# Patient Record
Sex: FEMALE | Race: OTHER | NOT HISPANIC OR LATINO | Employment: STUDENT | URBAN - METROPOLITAN AREA
[De-identification: names, ages, dates, MRNs, and addresses within clinical notes are randomized per-mention and may not be internally consistent; named-entity substitution may affect disease eponyms.]

---

## 2023-09-26 PROBLEM — N94.6 PAINFUL MENSTRUAL PERIODS: Status: ACTIVE | Noted: 2023-09-26

## 2023-09-26 RX ORDER — ONDANSETRON 4 MG/1
1 TABLET, FILM COATED ORAL 3 TIMES DAILY PRN
COMMUNITY
Start: 2022-08-16 | End: 2023-10-03 | Stop reason: ALTCHOICE

## 2023-09-26 RX ORDER — IBUPROFEN 800 MG/1
800 TABLET ORAL EVERY 8 HOURS PRN
COMMUNITY
Start: 2021-10-04 | End: 2023-10-03

## 2023-09-26 RX ORDER — NAPROXEN 500 MG/1
1 TABLET ORAL EVERY 12 HOURS PRN
COMMUNITY
Start: 2022-08-16 | End: 2023-10-03

## 2023-10-03 ENCOUNTER — APPOINTMENT (OUTPATIENT)
Dept: OBSTETRICS AND GYNECOLOGY | Facility: CLINIC | Age: 18
End: 2023-10-03
Payer: COMMERCIAL

## 2023-10-03 ENCOUNTER — LAB (OUTPATIENT)
Dept: LAB | Facility: LAB | Age: 18
End: 2023-10-03
Payer: COMMERCIAL

## 2023-10-03 ENCOUNTER — INITIAL PRENATAL (OUTPATIENT)
Dept: OBSTETRICS AND GYNECOLOGY | Facility: CLINIC | Age: 18
End: 2023-10-03
Payer: COMMERCIAL

## 2023-10-03 VITALS — DIASTOLIC BLOOD PRESSURE: 60 MMHG | SYSTOLIC BLOOD PRESSURE: 100 MMHG | BODY MASS INDEX: 22.89 KG/M2 | WEIGHT: 117.2 LBS

## 2023-10-03 DIAGNOSIS — Z3A.01 5 WEEKS GESTATION OF PREGNANCY (HHS-HCC): ICD-10-CM

## 2023-10-03 DIAGNOSIS — Z32.01 PREGNANCY CONFIRMED BY POSITIVE URINE TEST (HHS-HCC): ICD-10-CM

## 2023-10-03 DIAGNOSIS — Z34.01 PRIMIGRAVIDA, FIRST TRIMESTER (HHS-HCC): ICD-10-CM

## 2023-10-03 DIAGNOSIS — Z34.01 PRIMIGRAVIDA, FIRST TRIMESTER (HHS-HCC): Primary | ICD-10-CM

## 2023-10-03 LAB
AMPHETAMINES UR QL SCN: NORMAL
BARBITURATES UR QL SCN: NORMAL
BENZODIAZ UR QL SCN: NORMAL
BZE UR QL SCN: NORMAL
CANNABINOIDS UR QL SCN: NORMAL
ERYTHROCYTE [DISTWIDTH] IN BLOOD BY AUTOMATED COUNT: 13.5 % (ref 11.5–14.5)
FENTANYL+NORFENTANYL UR QL SCN: NORMAL
GLUCOSE URINE, POC: NEGATIVE
HCT VFR BLD AUTO: 39.7 % (ref 36–46)
HCV AB SER QL: NONREACTIVE
HGB BLD-MCNC: 13 G/DL (ref 12–16)
HIV 1+2 AB+HIV1 P24 AG SERPL QL IA: NONREACTIVE
MCH RBC QN AUTO: 29 PG (ref 26–34)
MCHC RBC AUTO-ENTMCNC: 32.7 G/DL (ref 32–36)
MCV RBC AUTO: 89 FL (ref 80–100)
NRBC BLD-RTO: 0 /100 WBCS (ref 0–0)
OPIATES UR QL SCN: NORMAL
OXYCODONE+OXYMORPHONE UR QL SCN: NORMAL
PCP UR QL SCN: NORMAL
PLATELET # BLD AUTO: 310 X10*3/UL (ref 150–450)
PMV BLD AUTO: 11 FL (ref 7.5–11.5)
PREGNANCY TEST URINE, POC: POSITIVE
RBC # BLD AUTO: 4.48 X10*6/UL (ref 4–5.2)
T PALLIDUM AB SER QL: NONREACTIVE
URINE PROTEIN, POC: NEGATIVE
WBC # BLD AUTO: 4.7 X10*3/UL (ref 4.4–11.3)

## 2023-10-03 PROCEDURE — 81025 URINE PREGNANCY TEST: CPT | Performed by: OBSTETRICS & GYNECOLOGY

## 2023-10-03 PROCEDURE — 36415 COLL VENOUS BLD VENIPUNCTURE: CPT

## 2023-10-03 PROCEDURE — 99214 OFFICE O/P EST MOD 30 MIN: CPT | Performed by: OBSTETRICS & GYNECOLOGY

## 2023-10-03 RX ORDER — MULTIVITAMIN
1 TABLET ORAL DAILY
COMMUNITY
End: 2024-05-06 | Stop reason: WASHOUT

## 2023-10-03 ASSESSMENT — ENCOUNTER SYMPTOMS
RESPIRATORY NEGATIVE: 0
NEUROLOGICAL NEGATIVE: 0
ENDOCRINE NEGATIVE: 0
HEMATOLOGIC/LYMPHATIC NEGATIVE: 0
PSYCHIATRIC NEGATIVE: 0
GASTROINTESTINAL NEGATIVE: 0
CONSTITUTIONAL NEGATIVE: 0
EYES NEGATIVE: 0
ALLERGIC/IMMUNOLOGIC NEGATIVE: 0
MUSCULOSKELETAL NEGATIVE: 0
CARDIOVASCULAR NEGATIVE: 0

## 2023-10-03 NOTE — PROGRESS NOTES
Urine protein and glucose negative. No recent travel, no travel plans during the pregnancy. COVID vaccinated x3 doses. Aware of Haylee deliveries. On call pediatrician.

## 2023-10-03 NOTE — PROGRESS NOTES
Subjective   Patient ID [id number]   Horacio Dominguez is a 18 y.o.  at 5w1d with a working estimated date of delivery of 6/3/2024, by Last Menstrual Period who presents for an initial prenatal visit. This pregnancy is uncomplicated, except they are first cousins but tested at home with negative major genetic issue. planned.    Her pregnancy is complicated by:  Consanguinty    OB History    Para Term  AB Living   1             SAB IAB Ectopic Multiple Live Births                  # Outcome Date GA Lbr Aniceto/2nd Weight Sex Delivery Anes PTL Lv   1 Current               Obstetric Comments   History of regular cycles          Objective   Physical Exam  Weight: 53.2 kg (117 lb 3.2 oz)  Expected Total Weight Gain: 11.5 kg (25 lb)-16 kg (35 lb)   Pregravid BMI: 22.85  BP: 100/60          OBGyn Exam    Prenatal Labs  None    Assessment/Plan   Intrauterine pregnancy at 5 weeks no significant risk factors identified patient and her  first cousin.  Okay and then    Immunizations: Immunization COVID vaccinated to get flu shot.  Prenatal Labs ordered  Daily prenatal vitamins prescribed  First trimester screening and second trimester screening discussed. Patient decided to Had testing back home for Thlasemia and anemia.   Follow up in 4 weeks for return OB visit.

## 2023-10-04 LAB
ABO GROUP (TYPE) IN BLOOD: NORMAL
ANTIBODY SCREEN: NORMAL
HBV SURFACE AG SERPL QL IA: NONREACTIVE
HEMOGLOBIN A2: 3.2 % (ref 2–3.5)
HEMOGLOBIN A: 96.3 % (ref 95.8–98)
HEMOGLOBIN C: 0 %
HEMOGLOBIN F: 0.5 % (ref 0–2)
HEMOGLOBIN IDENTIFICATION INTERPRETATION: NORMAL
HEMOGLOBIN OTHER: 0 %
HEMOGLOBIN S: 0 %
N GONORRHOEA DNA SPEC QL PROBE+SIG AMP: NEGATIVE
PATH REVIEW-HGB IDENTIFICATION: NORMAL
REFLEX ADDED, ANEMIA PANEL: NORMAL
RH FACTOR (ANTIGEN D): NORMAL
RUBV IGG SERPL IA-ACNC: 4.4 IA
RUBV IGG SERPL QL IA: POSITIVE

## 2023-10-05 LAB — BACTERIA UR CULT: NORMAL

## 2023-10-23 ENCOUNTER — ANCILLARY PROCEDURE (OUTPATIENT)
Dept: RADIOLOGY | Facility: CLINIC | Age: 18
End: 2023-10-23
Payer: COMMERCIAL

## 2023-10-23 ENCOUNTER — ROUTINE PRENATAL (OUTPATIENT)
Dept: OBSTETRICS AND GYNECOLOGY | Facility: CLINIC | Age: 18
End: 2023-10-23
Payer: COMMERCIAL

## 2023-10-23 VITALS — SYSTOLIC BLOOD PRESSURE: 98 MMHG | WEIGHT: 118.4 LBS | DIASTOLIC BLOOD PRESSURE: 62 MMHG | BODY MASS INDEX: 23.12 KG/M2

## 2023-10-23 DIAGNOSIS — Z34.01 ENCOUNTER FOR PRENATAL CARE IN FIRST TRIMESTER OF FIRST PREGNANCY (HHS-HCC): ICD-10-CM

## 2023-10-23 DIAGNOSIS — Z3A.08 8 WEEKS GESTATION OF PREGNANCY (HHS-HCC): Primary | ICD-10-CM

## 2023-10-23 DIAGNOSIS — O20.0 THREATENED ABORTION, ANTEPARTUM CONDITION OR COMPLICATION (HHS-HCC): ICD-10-CM

## 2023-10-23 DIAGNOSIS — Z34.01 PRIMIGRAVIDA, FIRST TRIMESTER (HHS-HCC): ICD-10-CM

## 2023-10-23 PROCEDURE — 76817 TRANSVAGINAL US OBSTETRIC: CPT | Performed by: OBSTETRICS & GYNECOLOGY

## 2023-10-23 PROCEDURE — 76801 OB US < 14 WKS SINGLE FETUS: CPT | Performed by: OBSTETRICS & GYNECOLOGY

## 2023-10-23 PROCEDURE — 99212 OFFICE O/P EST SF 10 MIN: CPT | Performed by: OBSTETRICS & GYNECOLOGY

## 2023-10-23 PROCEDURE — 76801 OB US < 14 WKS SINGLE FETUS: CPT

## 2023-10-23 PROCEDURE — 76817 TRANSVAGINAL US OBSTETRIC: CPT

## 2023-10-23 NOTE — PROGRESS NOTES
Subjective   Patient ID: Horacio Dominguez is a 18 y.o. female who presents for Pregnancy Ultrasound (Ultrasound this morning repeat in 1 week no FHB).  HPI 18-year-old [place] and G1, P0 who had an ultrasound this morning for dates and viability which showed no cardiac activity.  There was a gestational sac and a yolk sac that seem to be collapsing in a fetal pole without any heart rate.  She reports small spotting mostly dark brown.  No significant cramping.  She denies any nausea or vomiting but feels tired.    Review of Systems   All other systems reviewed and are negative.      Objective   Physical Exam  Constitutional:       Appearance: Normal appearance.   Pulmonary:      Effort: Pulmonary effort is normal.   Neurological:      Mental Status: She is alert.   Psychiatric:         Mood and Affect: Mood normal.         Assessment/Plan   Problem List Items Addressed This Visit             ICD-10-CM    Threatened , antepartum condition or complication O20.0    8 weeks gestation of pregnancy - Primary Z3A.08     Other Visit Diagnoses         Codes    Encounter for prenatal care in first trimester of first pregnancy     Z34.01    Relevant Orders    US MAC OB imaging order        I met with patient and her  today.  They had ultrasound this morning which showed no cardiac activity.  This seems to be Missed AB.   They are scheduled for follow-up ultrasound next week.  We did discuss that miscarriage rate is high and this is most likely a missed AB.  I discussed that she may start bleeding heavier and passed tissue on her own prior to her next appointment or if she has not done that then she can elect for suction D&C after her next ultrasound.  I explained what the procedure is outpatient.  She will call if her bleeding is very heavy and she is concerned.

## 2023-10-30 ENCOUNTER — APPOINTMENT (OUTPATIENT)
Dept: OBSTETRICS AND GYNECOLOGY | Facility: CLINIC | Age: 18
End: 2023-10-30
Payer: COMMERCIAL

## 2023-11-03 ENCOUNTER — ANCILLARY PROCEDURE (OUTPATIENT)
Dept: RADIOLOGY | Facility: CLINIC | Age: 18
End: 2023-11-03
Payer: COMMERCIAL

## 2023-11-03 DIAGNOSIS — Z34.01 ENCOUNTER FOR PRENATAL CARE IN FIRST TRIMESTER OF FIRST PREGNANCY (HHS-HCC): ICD-10-CM

## 2023-11-03 PROCEDURE — 76817 TRANSVAGINAL US OBSTETRIC: CPT | Performed by: STUDENT IN AN ORGANIZED HEALTH CARE EDUCATION/TRAINING PROGRAM

## 2023-11-03 PROCEDURE — 76816 OB US FOLLOW-UP PER FETUS: CPT

## 2023-11-03 PROCEDURE — 76816 OB US FOLLOW-UP PER FETUS: CPT | Performed by: STUDENT IN AN ORGANIZED HEALTH CARE EDUCATION/TRAINING PROGRAM

## 2023-11-03 PROCEDURE — 76817 TRANSVAGINAL US OBSTETRIC: CPT

## 2023-11-06 ENCOUNTER — ROUTINE PRENATAL (OUTPATIENT)
Dept: OBSTETRICS AND GYNECOLOGY | Facility: CLINIC | Age: 18
End: 2023-11-06
Payer: COMMERCIAL

## 2023-11-06 VITALS — BODY MASS INDEX: 22.07 KG/M2 | DIASTOLIC BLOOD PRESSURE: 62 MMHG | WEIGHT: 113 LBS | SYSTOLIC BLOOD PRESSURE: 98 MMHG

## 2023-11-06 DIAGNOSIS — O20.0 THREATENED ABORTION, ANTEPARTUM CONDITION OR COMPLICATION (HHS-HCC): ICD-10-CM

## 2023-11-06 DIAGNOSIS — Z3A.08 8 WEEKS GESTATION OF PREGNANCY (HHS-HCC): ICD-10-CM

## 2023-11-07 ENCOUNTER — OFFICE VISIT (OUTPATIENT)
Dept: OBSTETRICS AND GYNECOLOGY | Facility: CLINIC | Age: 18
End: 2023-11-07
Payer: COMMERCIAL

## 2023-11-07 DIAGNOSIS — O03.9 COMPLETE OR UNSPECIFIED SPONTANEOUS ABORTION WITHOUT COMPLICATION (HHS-HCC): ICD-10-CM

## 2023-11-07 DIAGNOSIS — N94.6 DYSMENORRHEA: Primary | ICD-10-CM

## 2023-11-07 PROCEDURE — 99212 OFFICE O/P EST SF 10 MIN: CPT | Performed by: OBSTETRICS & GYNECOLOGY

## 2023-11-07 PROCEDURE — 1036F TOBACCO NON-USER: CPT | Performed by: OBSTETRICS & GYNECOLOGY

## 2023-11-07 RX ORDER — NAPROXEN 500 MG/1
500 TABLET ORAL
Qty: 60 TABLET | Refills: 11 | Status: SHIPPED | OUTPATIENT
Start: 2023-11-07 | End: 2024-05-06

## 2023-11-07 NOTE — PROGRESS NOTES
Subjective   Patient ID: Horacio Dominguez is a 18 y.o. female who presents for No chief complaint on file..  HPI 18-year-old G1, P0 with a recent spontaneous miscarriage who is here for follow-up.  She had an ultrasound done on  which showed no evidence of intrauterine pregnancy no blood flow to the endometrial cavity endometrial lining homogeneous at 8 mm.  She reports that her bleeding has slowed down significantly only spotting and no significant cramping.  She is planning to try again soon.  I advised her to continue with prenatal vitamin daily use condoms for the next 2 to 3 weeks await a spontaneous.  Once she has an menstrual cycle then she can actively try again.  The chances of very good that she will get pregnant soon and will go to term.  Her blood type is B+.  Emotionally she seems to be doing okay.    Review of Systems   All other systems reviewed and are negative.      Objective   Physical Exam  Constitutional:       Appearance: Normal appearance.   Cardiovascular:      Rate and Rhythm: Normal rate and regular rhythm.   Pulmonary:      Effort: Pulmonary effort is normal.   Neurological:      Mental Status: She is alert.   Psychiatric:         Mood and Affect: Mood normal.         Behavior: Behavior normal.         Assessment/Plan   Problem List Items Addressed This Visit             ICD-10-CM    Dysmenorrhea - Primary N94.6    Relevant Medications    naproxen (Naprosyn) 500 mg tablet    Complete or unspecified spontaneous  without complication O03.9   Follow-up is as needed.  Recommended that she continues to take prenatal vitamin daily prescription for Naprosyn for painful.  Was sent to her pharmacy.

## 2023-11-20 ENCOUNTER — APPOINTMENT (OUTPATIENT)
Dept: OBSTETRICS AND GYNECOLOGY | Facility: CLINIC | Age: 18
End: 2023-11-20
Payer: COMMERCIAL

## 2023-12-18 ENCOUNTER — APPOINTMENT (OUTPATIENT)
Dept: OBSTETRICS AND GYNECOLOGY | Facility: CLINIC | Age: 18
End: 2023-12-18
Payer: COMMERCIAL

## 2024-01-15 ENCOUNTER — APPOINTMENT (OUTPATIENT)
Dept: OBSTETRICS AND GYNECOLOGY | Facility: CLINIC | Age: 19
End: 2024-01-15
Payer: COMMERCIAL

## 2024-02-12 ENCOUNTER — APPOINTMENT (OUTPATIENT)
Dept: OBSTETRICS AND GYNECOLOGY | Facility: CLINIC | Age: 19
End: 2024-02-12
Payer: COMMERCIAL

## 2024-03-11 ENCOUNTER — APPOINTMENT (OUTPATIENT)
Dept: OBSTETRICS AND GYNECOLOGY | Facility: CLINIC | Age: 19
End: 2024-03-11
Payer: COMMERCIAL

## 2024-03-25 ENCOUNTER — APPOINTMENT (OUTPATIENT)
Dept: OBSTETRICS AND GYNECOLOGY | Facility: CLINIC | Age: 19
End: 2024-03-25
Payer: COMMERCIAL

## 2024-04-08 ENCOUNTER — APPOINTMENT (OUTPATIENT)
Dept: OBSTETRICS AND GYNECOLOGY | Facility: CLINIC | Age: 19
End: 2024-04-08
Payer: COMMERCIAL

## 2024-04-17 PROBLEM — O20.0 THREATENED ABORTION, ANTEPARTUM CONDITION OR COMPLICATION (HHS-HCC): Status: RESOLVED | Noted: 2023-10-23 | Resolved: 2023-11-06

## 2024-04-17 PROBLEM — Z3A.08 8 WEEKS GESTATION OF PREGNANCY (HHS-HCC): Status: RESOLVED | Noted: 2023-10-23 | Resolved: 2023-11-06

## 2024-04-22 ENCOUNTER — APPOINTMENT (OUTPATIENT)
Dept: OBSTETRICS AND GYNECOLOGY | Facility: CLINIC | Age: 19
End: 2024-04-22
Payer: COMMERCIAL

## 2024-05-06 ENCOUNTER — APPOINTMENT (OUTPATIENT)
Dept: OBSTETRICS AND GYNECOLOGY | Facility: CLINIC | Age: 19
End: 2024-05-06
Payer: COMMERCIAL

## 2024-05-06 ENCOUNTER — INITIAL PRENATAL (OUTPATIENT)
Dept: OBSTETRICS AND GYNECOLOGY | Facility: CLINIC | Age: 19
End: 2024-05-06
Payer: COMMERCIAL

## 2024-05-06 VITALS — BODY MASS INDEX: 21.76 KG/M2 | DIASTOLIC BLOOD PRESSURE: 58 MMHG | WEIGHT: 111.4 LBS | SYSTOLIC BLOOD PRESSURE: 104 MMHG

## 2024-05-06 DIAGNOSIS — Z3A.01 7 WEEKS GESTATION OF PREGNANCY (HHS-HCC): ICD-10-CM

## 2024-05-06 DIAGNOSIS — Z34.91 FIRST TRIMESTER PREGNANCY (HHS-HCC): ICD-10-CM

## 2024-05-06 DIAGNOSIS — Z32.01 POSITIVE URINE PREGNANCY TEST (HHS-HCC): ICD-10-CM

## 2024-05-06 LAB
AMPHETAMINES UR QL SCN: NORMAL
BARBITURATES UR QL SCN: NORMAL
BENZODIAZ UR QL SCN: NORMAL
BZE UR QL SCN: NORMAL
CANNABINOIDS UR QL SCN: NORMAL
FENTANYL+NORFENTANYL UR QL SCN: NORMAL
METHADONE UR QL SCN: NORMAL
OPIATES UR QL SCN: NORMAL
OXYCODONE+OXYMORPHONE UR QL SCN: NORMAL
PCP UR QL SCN: NORMAL
POC GLUCOSE, URINE: NEGATIVE MG/DL
POC PROTEIN, URINE: NEGATIVE MG/DL
PREGNANCY TEST URINE, POC: POSITIVE

## 2024-05-06 PROCEDURE — 0500F INITIAL PRENATAL CARE VISIT: CPT | Performed by: OBSTETRICS & GYNECOLOGY

## 2024-05-06 PROCEDURE — 87800 DETECT AGNT MULT DNA DIREC: CPT

## 2024-05-06 PROCEDURE — 81025 URINE PREGNANCY TEST: CPT | Performed by: OBSTETRICS & GYNECOLOGY

## 2024-05-06 PROCEDURE — 87086 URINE CULTURE/COLONY COUNT: CPT

## 2024-05-06 PROCEDURE — 87661 TRICHOMONAS VAGINALIS AMPLIF: CPT

## 2024-05-06 PROCEDURE — 80307 DRUG TEST PRSMV CHEM ANLYZR: CPT

## 2024-05-06 RX ORDER — FOLIC ACID 0.8 MG
0.8 TABLET ORAL DAILY
COMMUNITY

## 2024-05-06 ASSESSMENT — EDINBURGH POSTNATAL DEPRESSION SCALE (EPDS)
I HAVE BEEN SO UNHAPPY THAT I HAVE BEEN CRYING: ONLY OCCASIONALLY
I HAVE LOOKED FORWARD WITH ENJOYMENT TO THINGS: AS MUCH AS I EVER DID
I HAVE BEEN ANXIOUS OR WORRIED FOR NO GOOD REASON: NO, NOT AT ALL
THINGS HAVE BEEN GETTING ON TOP OF ME: YES, SOMETIMES I HAVEN'T BEEN COPING AS WELL AS USUAL
I HAVE FELT SAD OR MISERABLE: NOT VERY OFTEN
THE THOUGHT OF HARMING MYSELF HAS OCCURRED TO ME: NEVER
I HAVE FELT SCARED OR PANICKY FOR NO GOOD REASON: NO, NOT AT ALL
I HAVE BEEN SO UNHAPPY THAT I HAVE HAD DIFFICULTY SLEEPING: YES, SOMETIMES
TOTAL SCORE: 8
I HAVE BLAMED MYSELF UNNECESSARILY WHEN THINGS WENT WRONG: YES, SOME OF THE TIME
I HAVE BEEN ABLE TO LAUGH AND SEE THE FUNNY SIDE OF THINGS: AS MUCH AS I ALWAYS COULD

## 2024-05-06 NOTE — PROGRESS NOTES
Subjective   Patient ID [id number]   Horacio Dominguez is a 18 y.o.  at 7w1d with a working estimated date of delivery of 2024, by Last Menstrual Period who presents for an initial prenatal visit. This pregnancy is planned.  Doing well.   Happy.  Recent SAB in 2023.       Her pregnancy is complicated by:  Recent AB    OB History    Para Term  AB Living   2       1     SAB IAB Ectopic Multiple Live Births   1       0      # Outcome Date GA Lbr Aniceto/2nd Weight Sex Delivery Anes PTL Lv   2 Current            1 SAB 10/28/23 8w5d    Complete         Obstetric Comments   History of regular cycles     [place]  Depression Scale Total: 8    Objective   Physical Exam  Weight: 50.5 kg (111 lb 6.4 oz)  Expected Total Weight Gain: 11.5 kg (25 lb)-16 kg (35 lb)   Pregravid BMI: 21.68  BP: 104/58          Physical Exam  Genitourinary:      Vaginal exam comments: Hymenal band seen. .         Prenatal Labs  Normal in 10/2023    Assessment/Plan   Problem List Items Addressed This Visit    None  Visit Diagnoses         Codes    Positive urine pregnancy test ([facility])     Z32.01    Relevant Orders    POCT pregnancy, urine manually resulted (Completed)    POCT UA (nonautomated) manually resulted (Completed)    First trimester pregnancy ([facility])     Z34.91    Relevant Orders    C. Trachomatis / N. Gonorrhoeae, Amplified Detection    Trichomonas vaginalis, Nucleic Acid Detection    CBC Anemia Panel With Reflex,Pregnancy    Drug Screen, Urine With Reflex to Confirmation    Hepatitis B Surface Antigen    Hepatitis C Antibody    HIV 1/2 Antigen/Antibody Screen with Reflex to Confirmation    Myriad Foresight Carrier Screen    POCT pregnancy, urine manually resulted (Completed)    POCT UA (nonautomated) manually resulted (Completed)    Rubella Antibody, IgG    Syphilis Screen with Reflex    Type And Screen    Urine Culture    US MAC OB imaging order    US OB < 14 weeks early    US OB detail fetal anatomy     7 weeks gestation of pregnancy ([facility])     Z3A.01            Immunizations: Up to date  Prenatal Labs from 10/2023 normal.   Daily prenatal vitamins prescribed is on Folic acid 1 mg daily as well.   First trimester screening and second trimester screening discussed. Patient decided to proceed.   Follow up in 4 weeks for return OB visit.

## 2024-05-06 NOTE — PROGRESS NOTES
Aware of [facility]lee deliveries  COVID vaccinated x3 doses  SAB x1  Undecided on pediatrician  Unsure for birth control after delivery  Came from [place] [facility] December 2023, no travel plans at current time  EPDS - 8  Recent SAB 10/28/2023 approx 8 weeks

## 2024-05-07 LAB
C TRACH RRNA SPEC QL NAA+PROBE: NEGATIVE
N GONORRHOEA DNA SPEC QL PROBE+SIG AMP: NEGATIVE
T VAGINALIS RRNA SPEC QL NAA+PROBE: NEGATIVE

## 2024-05-08 LAB — BACTERIA UR CULT: NO GROWTH

## 2024-05-13 ENCOUNTER — APPOINTMENT (OUTPATIENT)
Dept: OBSTETRICS AND GYNECOLOGY | Facility: CLINIC | Age: 19
End: 2024-05-13
Payer: COMMERCIAL

## 2024-05-20 ENCOUNTER — APPOINTMENT (OUTPATIENT)
Dept: OBSTETRICS AND GYNECOLOGY | Facility: CLINIC | Age: 19
End: 2024-05-20
Payer: COMMERCIAL

## 2024-05-24 ENCOUNTER — HOSPITAL ENCOUNTER (OUTPATIENT)
Dept: RADIOLOGY | Facility: CLINIC | Age: 19
Discharge: HOME | End: 2024-05-24
Payer: COMMERCIAL

## 2024-05-24 DIAGNOSIS — Z34.91 FIRST TRIMESTER PREGNANCY (HHS-HCC): ICD-10-CM

## 2024-05-24 PROCEDURE — 76801 OB US < 14 WKS SINGLE FETUS: CPT

## 2024-05-24 PROCEDURE — 76801 OB US < 14 WKS SINGLE FETUS: CPT | Performed by: OBSTETRICS & GYNECOLOGY

## 2024-05-27 ENCOUNTER — APPOINTMENT (OUTPATIENT)
Dept: RADIOLOGY | Facility: HOSPITAL | Age: 19
End: 2024-05-27
Payer: COMMERCIAL

## 2024-05-27 ENCOUNTER — HOSPITAL ENCOUNTER (EMERGENCY)
Facility: HOSPITAL | Age: 19
Discharge: HOME | End: 2024-05-27
Attending: STUDENT IN AN ORGANIZED HEALTH CARE EDUCATION/TRAINING PROGRAM
Payer: COMMERCIAL

## 2024-05-27 VITALS
OXYGEN SATURATION: 99 % | HEART RATE: 96 BPM | BODY MASS INDEX: 20.4 KG/M2 | HEIGHT: 61 IN | WEIGHT: 108.03 LBS | DIASTOLIC BLOOD PRESSURE: 66 MMHG | TEMPERATURE: 98.6 F | SYSTOLIC BLOOD PRESSURE: 104 MMHG | RESPIRATION RATE: 17 BRPM

## 2024-05-27 DIAGNOSIS — O99.891 ANTEPARTUM ASYMPTOMATIC BACTERIURIA (HHS-HCC): ICD-10-CM

## 2024-05-27 DIAGNOSIS — O20.0 THREATENED MISCARRIAGE (HHS-HCC): Primary | ICD-10-CM

## 2024-05-27 DIAGNOSIS — R82.71 ANTEPARTUM ASYMPTOMATIC BACTERIURIA (HHS-HCC): ICD-10-CM

## 2024-05-27 LAB
ABO GROUP (TYPE) IN BLOOD: NORMAL
ALBUMIN SERPL BCP-MCNC: 4.1 G/DL (ref 3.4–5)
ALP SERPL-CCNC: 38 U/L (ref 33–110)
ALT SERPL W P-5'-P-CCNC: 8 U/L (ref 7–45)
ANION GAP SERPL CALC-SCNC: 13 MMOL/L (ref 10–20)
ANTIBODY SCREEN: NORMAL
APPEARANCE UR: ABNORMAL
AST SERPL W P-5'-P-CCNC: 13 U/L (ref 9–39)
B-HCG SERPL-ACNC: ABNORMAL MIU/ML
BACTERIA #/AREA URNS AUTO: ABNORMAL /HPF
BASOPHILS # BLD AUTO: 0 X10*3/UL (ref 0–0.1)
BASOPHILS NFR BLD AUTO: 0 %
BILIRUB SERPL-MCNC: 0.2 MG/DL (ref 0–1.2)
BILIRUB UR STRIP.AUTO-MCNC: NEGATIVE MG/DL
BUN SERPL-MCNC: 7 MG/DL (ref 6–23)
CALCIUM SERPL-MCNC: 8.8 MG/DL (ref 8.6–10.3)
CHLORIDE SERPL-SCNC: 104 MMOL/L (ref 98–107)
CO2 SERPL-SCNC: 22 MMOL/L (ref 21–32)
COLOR UR: YELLOW
CREAT SERPL-MCNC: 0.42 MG/DL (ref 0.5–1.05)
EGFRCR SERPLBLD CKD-EPI 2021: >90 ML/MIN/1.73M*2
EOSINOPHIL # BLD AUTO: 0.05 X10*3/UL (ref 0–0.7)
EOSINOPHIL NFR BLD AUTO: 1 %
ERYTHROCYTE [DISTWIDTH] IN BLOOD BY AUTOMATED COUNT: 12.8 % (ref 11.5–14.5)
GLUCOSE SERPL-MCNC: 85 MG/DL (ref 74–99)
GLUCOSE UR STRIP.AUTO-MCNC: NORMAL MG/DL
HCT VFR BLD AUTO: 39.2 % (ref 36–46)
HGB BLD-MCNC: 13.7 G/DL (ref 12–16)
IMM GRANULOCYTES # BLD AUTO: 0.02 X10*3/UL (ref 0–0.7)
IMM GRANULOCYTES NFR BLD AUTO: 0.4 % (ref 0–0.9)
KETONES UR STRIP.AUTO-MCNC: ABNORMAL MG/DL
LEUKOCYTE ESTERASE UR QL STRIP.AUTO: ABNORMAL
LYMPHOCYTES # BLD AUTO: 1.71 X10*3/UL (ref 1.2–4.8)
LYMPHOCYTES NFR BLD AUTO: 32.7 %
MCH RBC QN AUTO: 29.3 PG (ref 26–34)
MCHC RBC AUTO-ENTMCNC: 34.9 G/DL (ref 32–36)
MCV RBC AUTO: 84 FL (ref 80–100)
MONOCYTES # BLD AUTO: 0.32 X10*3/UL (ref 0.1–1)
MONOCYTES NFR BLD AUTO: 6.1 %
MUCOUS THREADS #/AREA URNS AUTO: ABNORMAL /LPF
NEUTROPHILS # BLD AUTO: 3.13 X10*3/UL (ref 1.2–7.7)
NEUTROPHILS NFR BLD AUTO: 59.8 %
NITRITE UR QL STRIP.AUTO: NEGATIVE
NRBC BLD-RTO: 0 /100 WBCS (ref 0–0)
PH UR STRIP.AUTO: 5.5 [PH]
PLATELET # BLD AUTO: 277 X10*3/UL (ref 150–450)
POTASSIUM SERPL-SCNC: 4 MMOL/L (ref 3.5–5.3)
PROT SERPL-MCNC: 6.5 G/DL (ref 6.4–8.2)
PROT UR STRIP.AUTO-MCNC: ABNORMAL MG/DL
RBC # BLD AUTO: 4.67 X10*6/UL (ref 4–5.2)
RBC # UR STRIP.AUTO: ABNORMAL /UL
RBC #/AREA URNS AUTO: ABNORMAL /HPF
RH FACTOR (ANTIGEN D): NORMAL
SODIUM SERPL-SCNC: 135 MMOL/L (ref 136–145)
SP GR UR STRIP.AUTO: 1.03
SQUAMOUS #/AREA URNS AUTO: ABNORMAL /HPF
UROBILINOGEN UR STRIP.AUTO-MCNC: NORMAL MG/DL
WBC # BLD AUTO: 5.2 X10*3/UL (ref 4.4–11.3)
WBC #/AREA URNS AUTO: ABNORMAL /HPF

## 2024-05-27 PROCEDURE — 76815 OB US LIMITED FETUS(S): CPT | Performed by: STUDENT IN AN ORGANIZED HEALTH CARE EDUCATION/TRAINING PROGRAM

## 2024-05-27 PROCEDURE — 85025 COMPLETE CBC W/AUTO DIFF WBC: CPT

## 2024-05-27 PROCEDURE — 86901 BLOOD TYPING SEROLOGIC RH(D): CPT

## 2024-05-27 PROCEDURE — 76801 OB US < 14 WKS SINGLE FETUS: CPT

## 2024-05-27 PROCEDURE — 80053 COMPREHEN METABOLIC PANEL: CPT

## 2024-05-27 PROCEDURE — 84702 CHORIONIC GONADOTROPIN TEST: CPT

## 2024-05-27 PROCEDURE — 36415 COLL VENOUS BLD VENIPUNCTURE: CPT

## 2024-05-27 PROCEDURE — 81001 URINALYSIS AUTO W/SCOPE: CPT

## 2024-05-27 PROCEDURE — 87086 URINE CULTURE/COLONY COUNT: CPT | Mod: AHULAB

## 2024-05-27 PROCEDURE — 99284 EMERGENCY DEPT VISIT MOD MDM: CPT | Mod: 25

## 2024-05-27 RX ORDER — CEPHALEXIN 500 MG/1
500 CAPSULE ORAL 3 TIMES DAILY
Qty: 15 CAPSULE | Refills: 0 | Status: SHIPPED | OUTPATIENT
Start: 2024-05-27 | End: 2024-06-01

## 2024-05-27 ASSESSMENT — PAIN SCALES - GENERAL
PAINLEVEL_OUTOF10: 0 - NO PAIN
PAINLEVEL_OUTOF10: 0 - NO PAIN

## 2024-05-27 ASSESSMENT — COLUMBIA-SUICIDE SEVERITY RATING SCALE - C-SSRS
2. HAVE YOU ACTUALLY HAD ANY THOUGHTS OF KILLING YOURSELF?: NO
6. HAVE YOU EVER DONE ANYTHING, STARTED TO DO ANYTHING, OR PREPARED TO DO ANYTHING TO END YOUR LIFE?: NO
1. IN THE PAST MONTH, HAVE YOU WISHED YOU WERE DEAD OR WISHED YOU COULD GO TO SLEEP AND NOT WAKE UP?: NO

## 2024-05-27 ASSESSMENT — PAIN - FUNCTIONAL ASSESSMENT
PAIN_FUNCTIONAL_ASSESSMENT: 0-10
PAIN_FUNCTIONAL_ASSESSMENT: 0-10

## 2024-05-28 ENCOUNTER — APPOINTMENT (OUTPATIENT)
Dept: LAB | Facility: LAB | Age: 19
End: 2024-05-28
Payer: COMMERCIAL

## 2024-05-28 ENCOUNTER — APPOINTMENT (OUTPATIENT)
Dept: OBSTETRICS AND GYNECOLOGY | Facility: CLINIC | Age: 19
End: 2024-05-28
Payer: COMMERCIAL

## 2024-05-28 ENCOUNTER — ROUTINE PRENATAL (OUTPATIENT)
Dept: OBSTETRICS AND GYNECOLOGY | Facility: CLINIC | Age: 19
End: 2024-05-28
Payer: COMMERCIAL

## 2024-05-28 VITALS — DIASTOLIC BLOOD PRESSURE: 60 MMHG | BODY MASS INDEX: 20.6 KG/M2 | WEIGHT: 109 LBS | SYSTOLIC BLOOD PRESSURE: 98 MMHG

## 2024-05-28 DIAGNOSIS — Z3A.10 10 WEEKS GESTATION OF PREGNANCY (HHS-HCC): ICD-10-CM

## 2024-05-28 LAB
POC GLUCOSE, URINE: NEGATIVE MG/DL
POC PROTEIN, URINE: NEGATIVE MG/DL

## 2024-05-28 PROCEDURE — 0501F PRENATAL FLOW SHEET: CPT | Performed by: OBSTETRICS & GYNECOLOGY

## 2024-05-28 NOTE — PROGRESS NOTES
"States was seen in ED yesterday for \"bloody discharge\". States had US and \"everything was good\". Reports are in EPIC.   "

## 2024-05-28 NOTE — PROGRESS NOTES
"Subjective   Patient ID [id number]   Horacio Dominguez is a 18 y.o.  at 10w2d with a working estimated date of delivery of 2024, by Last Menstrual Period who presents for a routine prenatal visit. She denies vaginal bleeding, leakage of fluid, decreased fetal movements, and contractions. Had some brown discharge went to ER last night given Keflex for possible UTI has not taken waiting for visit today.   FHT elevated and had U/S with normal findings.   They want to do cell free DNA test.   FHT reassuring.   No bright red bleeding.  No sex last 2 weeks.     Her pregnancy is complicated by:  None    Objective   Physical Exam  Weight: 49.4 kg (109 lb), Pregravid BMI: 20.98  Expected Total Weight Gain: 11.5 kg (25 lb)-16 kg (35 lb)   BP: 98/60  Fetal Heart Rate: 170      Prenatal Labs  Urine dip:  Lab Results   Component Value Date    KETONESU 10 (1+) (A) 2024    GLUCOSEUR NEGATIVE 10/03/2023     Lab Results   Component Value Date    HGB 13.7 2024    HCT 39.2 2024    ABO B 2024    HEPBSAG Nonreactive 10/03/2023     No results found for: \"PAPPA\", \"AFP\", \"HCG\", \"ESTRIOL\", \"INHBA\"    Imaging  The most recent ultrasound was performed on 2024 with a study GA of  .          Assessment/Plan   Problem List Items Addressed This Visit    None  Visit Diagnoses         Codes    10 weeks gestation of pregnancy ([facility])     Z3A.10    Relevant Orders    Myriad Prequel Prenatal Screen    POCT UA (nonautomated) manually resulted (Completed)            Continue prenatal vitamin.  Labs reviewed.  Cell free DNA test ordered.   Order placed for anatomy scan at 20 weeks.    Follow up in 4 weeks for a routine prenatal visit.  "

## 2024-05-28 NOTE — ED NOTES
Assumed care of patient ; patient is requesting nausea and pain meds; doctor to see patient     Misty Arnold RN  05/27/24 2000

## 2024-05-28 NOTE — ED PROVIDER NOTES
HPI   Chief Complaint   Patient presents with    Vaginal Discharge       Otherwise healthy 18-year-old female G2, P1 at 10 weeks presenting to the ED today with some dark brown spotting.  Started a little bit last night but then had some this morning and this afternoon.  She is having no cramping no dysuria and urinary frequency.  No real nausea or vomiting.  A little bit of fatigue.  Has not had this before.        History provided by:  Spouse                      Otis Coma Scale Score: 15                     Patient History   No past medical history on file.  No past surgical history on file.  Family History   Problem Relation Name Age of Onset    Diabetes Maternal Grandmother Aida      Social History     Tobacco Use    Smoking status: Never     Passive exposure: Never    Smokeless tobacco: Never   Vaping Use    Vaping status: Never Used   Substance Use Topics    Alcohol use: Never    Drug use: Never       Physical Exam   ED Triage Vitals [05/27/24 1644]   Temperature Heart Rate Respirations BP   37 °C (98.6 °F) (!) 102 18 103/66      Pulse Ox Temp Source Heart Rate Source Patient Position   100 % Temporal Monitor Sitting      BP Location FiO2 (%)     Left arm --       Physical Exam  HENT:      Head: Normocephalic.   Cardiovascular:      Rate and Rhythm: Normal rate.      Heart sounds: Normal heart sounds.   Pulmonary:      Effort: Pulmonary effort is normal.      Breath sounds: Normal breath sounds.   Abdominal:      General: There is no distension.      Tenderness: There is no abdominal tenderness.   Skin:     General: Skin is warm and dry.      Capillary Refill: Capillary refill takes less than 2 seconds.   Neurological:      General: No focal deficit present.      Mental Status: She is alert.   Psychiatric:         Mood and Affect: Mood normal.         ED Course & MDM   ED Course as of 05/27/24 2009   Mon May 27, 2024   2005 Bicarbonate: 22 [CORA]      ED Course User Index  [CORA] Virginia Cooper MD          Diagnoses as of 05/27/24 2009   Threatened miscarriage ([facility])   Antepartum asymptomatic bacteriuria ([facility])       Medical Decision Making  10-week gestation, threatened miscarriage.  Ultrasound independently interpreted by me, appreciate good fetal heart rate and movement.  She has no ongoing true bleeding just this dark brown likely some old blood mixed with discharge.  Did have some asymptomatic bacteriuria.  Initially triage provider ordered STD swabs she just had them performed a couple weeks ago with no new partners and no new concerns therefore did not obtain those today.  Given Keflex for asymptomatic bacteriuria in pregnancy.  Prescription provided.  She does already have an appointment with OB tomorrow to follow-up.  Given return precautions for threatened miscarriage.    Amount and/or Complexity of Data Reviewed  Independent Historian: spouse        Procedure  Procedures     Virginia Cooper MD  05/27/24 2010

## 2024-05-29 LAB — BACTERIA UR CULT: NORMAL

## 2024-06-03 ENCOUNTER — APPOINTMENT (OUTPATIENT)
Dept: OBSTETRICS AND GYNECOLOGY | Facility: CLINIC | Age: 19
End: 2024-06-03
Payer: COMMERCIAL

## 2024-06-05 ENCOUNTER — APPOINTMENT (OUTPATIENT)
Dept: RADIOLOGY | Facility: CLINIC | Age: 19
End: 2024-06-05
Payer: COMMERCIAL

## 2024-06-07 LAB — SCAN RESULT: NORMAL

## 2024-06-08 ENCOUNTER — HOSPITAL ENCOUNTER (EMERGENCY)
Facility: HOSPITAL | Age: 19
Discharge: HOME | End: 2024-06-09
Attending: STUDENT IN AN ORGANIZED HEALTH CARE EDUCATION/TRAINING PROGRAM
Payer: COMMERCIAL

## 2024-06-08 DIAGNOSIS — O20.0 THREATENED ABORTION (HHS-HCC): Primary | ICD-10-CM

## 2024-06-08 PROCEDURE — 87086 URINE CULTURE/COLONY COUNT: CPT | Mod: AHULAB | Performed by: STUDENT IN AN ORGANIZED HEALTH CARE EDUCATION/TRAINING PROGRAM

## 2024-06-08 PROCEDURE — 99283 EMERGENCY DEPT VISIT LOW MDM: CPT

## 2024-06-08 PROCEDURE — 81001 URINALYSIS AUTO W/SCOPE: CPT | Performed by: STUDENT IN AN ORGANIZED HEALTH CARE EDUCATION/TRAINING PROGRAM

## 2024-06-08 ASSESSMENT — PAIN - FUNCTIONAL ASSESSMENT: PAIN_FUNCTIONAL_ASSESSMENT: 0-10

## 2024-06-08 ASSESSMENT — COLUMBIA-SUICIDE SEVERITY RATING SCALE - C-SSRS
1. IN THE PAST MONTH, HAVE YOU WISHED YOU WERE DEAD OR WISHED YOU COULD GO TO SLEEP AND NOT WAKE UP?: NO
2. HAVE YOU ACTUALLY HAD ANY THOUGHTS OF KILLING YOURSELF?: NO
6. HAVE YOU EVER DONE ANYTHING, STARTED TO DO ANYTHING, OR PREPARED TO DO ANYTHING TO END YOUR LIFE?: NO

## 2024-06-08 ASSESSMENT — PAIN SCALES - GENERAL: PAINLEVEL_OUTOF10: 0 - NO PAIN

## 2024-06-09 VITALS
SYSTOLIC BLOOD PRESSURE: 100 MMHG | RESPIRATION RATE: 17 BRPM | DIASTOLIC BLOOD PRESSURE: 69 MMHG | HEART RATE: 89 BPM | OXYGEN SATURATION: 99 % | TEMPERATURE: 97.9 F | BODY MASS INDEX: 19.98 KG/M2 | WEIGHT: 105.82 LBS | HEIGHT: 61 IN

## 2024-06-09 LAB
ABO GROUP (TYPE) IN BLOOD: NORMAL
APPEARANCE UR: ABNORMAL
B-HCG SERPL-ACNC: ABNORMAL MIU/ML
BACTERIA #/AREA URNS AUTO: ABNORMAL /HPF
BILIRUB UR STRIP.AUTO-MCNC: NEGATIVE MG/DL
COLOR UR: ABNORMAL
GLUCOSE UR STRIP.AUTO-MCNC: NORMAL MG/DL
KETONES UR STRIP.AUTO-MCNC: ABNORMAL MG/DL
LEUKOCYTE ESTERASE UR QL STRIP.AUTO: ABNORMAL
NITRITE UR QL STRIP.AUTO: NEGATIVE
PH UR STRIP.AUTO: 6.5 [PH]
PROT UR STRIP.AUTO-MCNC: NEGATIVE MG/DL
RBC # UR STRIP.AUTO: NEGATIVE /UL
RBC #/AREA URNS AUTO: ABNORMAL /HPF
RH FACTOR (ANTIGEN D): NORMAL
SP GR UR STRIP.AUTO: 1.02
SQUAMOUS #/AREA URNS AUTO: ABNORMAL /HPF
UROBILINOGEN UR STRIP.AUTO-MCNC: NORMAL MG/DL
WBC #/AREA URNS AUTO: ABNORMAL /HPF

## 2024-06-09 PROCEDURE — 86901 BLOOD TYPING SEROLOGIC RH(D): CPT | Performed by: STUDENT IN AN ORGANIZED HEALTH CARE EDUCATION/TRAINING PROGRAM

## 2024-06-09 PROCEDURE — 36415 COLL VENOUS BLD VENIPUNCTURE: CPT | Performed by: STUDENT IN AN ORGANIZED HEALTH CARE EDUCATION/TRAINING PROGRAM

## 2024-06-09 PROCEDURE — 84702 CHORIONIC GONADOTROPIN TEST: CPT | Performed by: STUDENT IN AN ORGANIZED HEALTH CARE EDUCATION/TRAINING PROGRAM

## 2024-06-09 NOTE — ED PROVIDER NOTES
HPI   Chief Complaint   Patient presents with    Vaginal Bleeding - Pregnant       HPI  19-year-old G2, P0 female at 12 weeks by 9-week ultrasound who presents for vaginal bleeding.  Patient notes small amount of bright red blood per vagina shortly prior to ED arrival.  She states this is subsequently stopped.  She denies any passage of clots or fetal tissue.  She denies any abdominal pain and has no other complaints.                  Otis Coma Scale Score: 15                     Patient History   No past medical history on file.  No past surgical history on file.  Family History   Problem Relation Name Age of Onset    Diabetes Maternal Grandmother Aida      Social History     Tobacco Use    Smoking status: Never     Passive exposure: Never    Smokeless tobacco: Never   Vaping Use    Vaping status: Never Used   Substance Use Topics    Alcohol use: Never    Drug use: Never       Physical Exam   ED Triage Vitals [24 2138]   Temperature Heart Rate Respirations BP   36.4 °C (97.5 °F) (!) 105 16 99/63      Pulse Ox Temp Source Heart Rate Source Patient Position   99 % Temporal Monitor --      BP Location FiO2 (%)     -- --       Physical Exam  Vitals and nursing note reviewed.   Constitutional:       Appearance: Normal appearance.   HENT:      Head: Normocephalic.      Mouth/Throat:      Mouth: Mucous membranes are moist.   Eyes:      Conjunctiva/sclera: Conjunctivae normal.   Cardiovascular:      Rate and Rhythm: Normal rate.   Pulmonary:      Effort: Pulmonary effort is normal.   Abdominal:      General: Abdomen is flat.      Palpations: Abdomen is soft.   Neurological:      Mental Status: She is alert and oriented to person, place, and time.   Psychiatric:         Mood and Affect: Mood normal.         ED Course & MDM   Diagnoses as of 24 0025   Threatened  ([facility])       Medical Decision Making  19-year-old G2, P0 female at 12 weeks gestational age who presents for small amount of vaginal  bleeding prior to ED arrival arrival that is subsequently stopped.  On exam, patient with low normal blood pressure but that appears similar to her baseline, otherwise she is in no acute distress and nontoxic-appearing, abdomen is soft nontender.  Point-of-care ultrasound performed revealed normal-appearing intrauterine pregnancy with fetal heart rate of 180.  Patient was observed in the ED with no subsequent episodes of vaginal bleeding.  Vaginal exam deferred as patient has follow-up with her OB/GYN within the next 72 hours and would prefer to defer at this time.    Urinalysis and hCG were collected.  Patient is Rh+ based on prior blood work, no indication for RhoGAM.  Patient discharged home in stable condition.    Procedure  Procedures     Paul Wilkinson, DO  Resident  06/09/24 0026

## 2024-06-09 NOTE — DISCHARGE INSTRUCTIONS
You were seen for vaginal bleeding while pregnant.  Ultrasound here in the emergency department showed a normal intrauterine pregnancy with normal fetal heart rate.  This small amount of vaginal bleeding could represent an early miscarriage or it could mean nothing at all.  It is too early to tell.  I do recommend that you follow-up with your OB/GYN to discuss appropriate weight gain and the intermittent vaginal bleeding.

## 2024-06-09 NOTE — ED TRIAGE NOTES
Pt c/o vaginal bleeding for the last hour. Pt is 12 weeks pregnant and states that she would just like an Ultrasound.

## 2024-06-10 LAB — BACTERIA UR CULT: NO GROWTH

## 2024-06-11 ENCOUNTER — ROUTINE PRENATAL (OUTPATIENT)
Dept: OBSTETRICS AND GYNECOLOGY | Facility: CLINIC | Age: 19
End: 2024-06-11
Payer: COMMERCIAL

## 2024-06-11 VITALS — BODY MASS INDEX: 20.37 KG/M2 | WEIGHT: 107.8 LBS | SYSTOLIC BLOOD PRESSURE: 80 MMHG | DIASTOLIC BLOOD PRESSURE: 60 MMHG

## 2024-06-11 DIAGNOSIS — Z3A.12 12 WEEKS GESTATION OF PREGNANCY (HHS-HCC): Primary | ICD-10-CM

## 2024-06-11 DIAGNOSIS — K62.5 RECTAL BLEEDING: ICD-10-CM

## 2024-06-11 LAB
POC GLUCOSE, URINE: NEGATIVE MG/DL
POC KETONES, URINE: ABNORMAL MG/DL
POC PROTEIN, URINE: NEGATIVE MG/DL

## 2024-06-11 PROCEDURE — 0501F PRENATAL FLOW SHEET: CPT | Performed by: OBSTETRICS & GYNECOLOGY

## 2024-06-11 NOTE — PROGRESS NOTES
"Subjective   Patient ID [id number]   Horacio Dominguez is a 19 y.o.  at 12w2d with a working estimated date of delivery of 2024, by Last Menstrual Period who presents for a routine prenatal visit. She denies vaginal bleeding, leakage of fluid, decreased fetal movements, or contractions.Doing well, but had some bleeding from rectum x 2 no constipation no diarrhea no sex since found out pregnant.   No urinary symptoms.   No family history of colon cancer.      Her pregnancy is complicated by:  Poor weight gain.  Nausea    Objective   Physical Exam  Weight: 48.9 kg (107 lb 12.8 oz)  Expected Total Weight Gain: 11.5 kg (25 lb)-16 kg (35 lb)   Pregravid BMI: 20.98  BP: 80/60  Fetal Heart Rate: 168 Fundal Height (cm): 12 cm    Prenatal Labs  Urine dip:  Lab Results   Component Value Date    KETONESU 15 (1+) (A) 2024    GLUCOSEUR NEGATIVE 10/03/2023       Lab Results   Component Value Date    HGB 13.7 2024    HCT 39.2 2024    ABO B 2024    HEPBSAG Nonreactive 10/03/2023     No results found for: \"PAPPA\", \"AFP\", \"HCG\", \"ESTRIOL\", \"INHBA\"  No results found for: \"GLUF\", \"GLUT1\", \"BYGGANV8WK\", \"TYELCWR8AN\"    Imaging  The most recent ultrasound was performed on 2024 with a study GA of   and EFW of  .          Assessment/Plan   Problem List Items Addressed This Visit    None  Visit Diagnoses         Codes    12 weeks gestation of pregnancy ([facility])    -  Primary Z3A.12    Relevant Orders    POCT UA Automated manually resulted (Completed)    Rectal bleeding     K62.5            Continue prenatal vitamin.  I have recommended she use hemorrhoidal cream over-the-counter and monitoring for any further bleeding.  If she continues to have bleeding we will refer her to GI for possible colonoscopy.  Labs reviewed.  Rhogam N/A.  GTT N/A.  Follow up in 4 weeks for a routine prenatal visit.  "

## 2024-06-12 LAB — SCAN RESULT: NORMAL

## 2024-06-25 ENCOUNTER — APPOINTMENT (OUTPATIENT)
Dept: OBSTETRICS AND GYNECOLOGY | Facility: CLINIC | Age: 19
End: 2024-06-25
Payer: COMMERCIAL

## 2024-07-02 ENCOUNTER — APPOINTMENT (OUTPATIENT)
Dept: OBSTETRICS AND GYNECOLOGY | Facility: CLINIC | Age: 19
End: 2024-07-02
Payer: COMMERCIAL

## 2024-07-02 VITALS — SYSTOLIC BLOOD PRESSURE: 88 MMHG | DIASTOLIC BLOOD PRESSURE: 54 MMHG | WEIGHT: 109.2 LBS | BODY MASS INDEX: 20.63 KG/M2

## 2024-07-02 DIAGNOSIS — Z3A.15 15 WEEKS GESTATION OF PREGNANCY (HHS-HCC): Primary | ICD-10-CM

## 2024-07-02 LAB
POC GLUCOSE, URINE: NEGATIVE MG/DL
POC PROTEIN, URINE: NEGATIVE MG/DL

## 2024-07-02 PROCEDURE — 0501F PRENATAL FLOW SHEET: CPT | Performed by: OBSTETRICS & GYNECOLOGY

## 2024-07-02 RX ORDER — FAMOTIDINE 20 MG/1
20 TABLET, FILM COATED ORAL 2 TIMES DAILY
Qty: 90 TABLET | Refills: 3 | Status: SHIPPED | OUTPATIENT
Start: 2024-07-02 | End: 2025-07-02

## 2024-07-02 NOTE — PROGRESS NOTES
"Subjective   Patient ID [id number]   Horacio Dominguez is a 19 y.o.  at 15w2d with a working estimated date of delivery of 2024, by Last Menstrual Period who presents for a routine prenatal visit. She denies vaginal bleeding, leakage of fluid, decreased fetal movements, or contractions.  Doing well, mild nausea, no headaches.  Some heartburn.       Her pregnancy is complicated by:  None    Objective   Physical Exam  Weight: 49.5 kg (109 lb 3.2 oz)  Expected Total Weight Gain: 11.5 kg (25 lb)-16 kg (35 lb)   Pregravid BMI: 20.98  BP: 88/54  Fetal Heart Rate: 138      Prenatal Labs  Urine dip:  Lab Results   Component Value Date    KETONESU 15 (1+) (A) 2024    GLUCOSEUR NEGATIVE 10/03/2023       Lab Results   Component Value Date    HGB 13.7 2024    HCT 39.2 2024    ABO B 2024    HEPBSAG Nonreactive 10/03/2023     No results found for: \"PAPPA\", \"AFP\", \"HCG\", \"ESTRIOL\", \"INHBA\"  No results found for: \"GLUF\", \"GLUT1\", \"PSRWVMW5IE\", \"[id number]\"    Imaging  The most recent ultrasound was performed on 2024 with a study GA of   and EFW of  .          Assessment/Plan   Problem List Items Addressed This Visit    None  Visit Diagnoses         Codes    15 weeks gestation of pregnancy ([facility])    -  Primary Z3A.15    Relevant Medications    famotidine (Pepcid) 20 mg tablet    Other Relevant Orders    POCT UA Automated manually resulted (Completed)            Continue prenatal vitamin.  Labs reviewed.  Rhogam N/A  GTT N/A.  Follow up in 4 weeks for a routine prenatal visit. Anatomy scan in 4-5 weeks.   "

## 2024-08-02 ENCOUNTER — HOSPITAL ENCOUNTER (OUTPATIENT)
Dept: RADIOLOGY | Facility: CLINIC | Age: 19
Discharge: HOME | End: 2024-08-02
Payer: COMMERCIAL

## 2024-08-02 DIAGNOSIS — Z34.91 FIRST TRIMESTER PREGNANCY (HHS-HCC): ICD-10-CM

## 2024-08-02 PROCEDURE — 76805 OB US >/= 14 WKS SNGL FETUS: CPT | Performed by: OBSTETRICS & GYNECOLOGY

## 2024-08-02 PROCEDURE — 76805 OB US >/= 14 WKS SNGL FETUS: CPT

## 2024-08-06 ENCOUNTER — APPOINTMENT (OUTPATIENT)
Dept: OBSTETRICS AND GYNECOLOGY | Facility: CLINIC | Age: 19
End: 2024-08-06
Payer: COMMERCIAL

## 2024-08-06 VITALS — BODY MASS INDEX: 21.54 KG/M2 | WEIGHT: 114 LBS | SYSTOLIC BLOOD PRESSURE: 98 MMHG | DIASTOLIC BLOOD PRESSURE: 52 MMHG

## 2024-08-06 DIAGNOSIS — Z3A.20 20 WEEKS GESTATION OF PREGNANCY (HHS-HCC): Primary | ICD-10-CM

## 2024-08-06 LAB
POC GLUCOSE, URINE: NEGATIVE MG/DL
POC PROTEIN, URINE: NEGATIVE MG/DL

## 2024-08-06 PROCEDURE — 0501F PRENATAL FLOW SHEET: CPT | Performed by: OBSTETRICS & GYNECOLOGY

## 2024-08-06 NOTE — PROGRESS NOTES
"Subjective   Patient ID [id number]   Horacio Dominguez is a 19 y.o.  at 20w2d with a working estimated date of delivery of 2024, by Last Menstrual Period who presents for a routine prenatal visit. She denies vaginal bleeding, leakage of fluid, decreased fetal movements, or contractions.  Doing well.     Her pregnancy is complicated by:  None    Objective   Physical Exam  Weight: 51.7 kg (114 lb)  Expected Total Weight Gain: 11.5 kg (25 lb)-16 kg (35 lb)   Pregravid BMI: 20.98  BP: 98/52  Fetal Heart Rate: 158      Prenatal Labs  Urine dip:  Lab Results   Component Value Date    KETONESU 15 (1+) (A) 2024    GLUCOSEUR NEGATIVE 10/03/2023       Lab Results   Component Value Date    HGB 13.7 2024    HCT 39.2 2024    ABO B 2024    HEPBSAG Nonreactive 10/03/2023     No results found for: \"PAPPA\", \"AFP\", \"HCG\", \"ESTRIOL\", \"INHBA\"  No results found for: \"GLUF\", \"GLUT1\", \"GGROKUW5DV\", \"[id number]\"    Imaging  The most recent ultrasound was performed on   with a study GA of   and EFW of  .          Assessment/Plan   Problem List Items Addressed This Visit    None  Visit Diagnoses         Codes    20 weeks gestation of pregnancy ([facility])    -  Primary Z3A.20    Relevant Orders    POCT UA Automated manually resulted (Completed)            Continue prenatal vitamin.  Labs reviewed.  Rhogam N/A.  Recent anatomy U/S normal.   GTT at 24-28 weeks. .  Follow up in 4 weeks for a routine prenatal visit.  "

## 2024-09-03 ENCOUNTER — APPOINTMENT (OUTPATIENT)
Dept: OBSTETRICS AND GYNECOLOGY | Facility: CLINIC | Age: 19
End: 2024-09-03
Payer: COMMERCIAL

## 2024-09-03 VITALS — WEIGHT: 119.2 LBS | SYSTOLIC BLOOD PRESSURE: 90 MMHG | DIASTOLIC BLOOD PRESSURE: 52 MMHG | BODY MASS INDEX: 22.52 KG/M2

## 2024-09-03 DIAGNOSIS — Z3A.24 24 WEEKS GESTATION OF PREGNANCY (HHS-HCC): Primary | ICD-10-CM

## 2024-09-03 LAB
POC GLUCOSE, URINE: NEGATIVE MG/DL
POC PROTEIN, URINE: NEGATIVE MG/DL

## 2024-09-03 PROCEDURE — 0501F PRENATAL FLOW SHEET: CPT | Performed by: OBSTETRICS & GYNECOLOGY

## 2024-09-03 NOTE — PROGRESS NOTES
"Subjective   Patient ID [id number]   Horacio Dominguez is a 19 y.o.  at 24w2d with a working estimated date of delivery of 2024, by Last Menstrual Period who presents for a routine prenatal visit. She denies vaginal bleeding, leakage of fluid, decreased fetal movements, or contractions.  Doing well.     Her pregnancy is complicated by:  None    Objective   Physical Exam:   Weight: 54.1 kg (119 lb 3.2 oz)  Expected Total Weight Gain: 11.5 kg (25 lb)-16 kg (35 lb)   Pregravid BMI: 20.98  BP: 90/52  Fetal Heart Rate: 159 Fundal Height (cm): 24 cm             Prenatal Labs  Urine Dip:  Lab Results   Component Value Date    KETONESU 15 (1+) (A) 2024    GLUCOSEUR NEGATIVE 10/03/2023     Lab Results   Component Value Date    HGB 13.7 2024    HCT 39.2 2024    ABO B 2024    HEPBSAG Nonreactive 10/03/2023     No results found for: \"PAPPA\", \"AFP\", \"HCG\", \"ESTRIOL\", \"INHBA\"  No results found for: \"GLUF\", \"GLUT1\", \"QOSHMSL1HJ\", \"[id number]\"    Imaging  The most recent ultrasound was performed on   with a study GA of   and EFW of  .          Assessment/Plan   Problem List Items Addressed This Visit    None  Visit Diagnoses         Codes    24 weeks gestation of pregnancy ([facility])    -  Primary Z3A.24    Relevant Orders    CBC Anemia Panel With Reflex,Pregnancy    Glucose, 1 Hour Screen, Pregnancy    Syphilis Screen with Reflex    POCT UA Automated manually resulted (Completed)          Continue prenatal vitamin.  Labs reviewed.  GBS taken.  Expected mode of delivery Vaginal  Follow up in 4 weeks for a routine prenatal visit.  "

## 2024-09-16 ENCOUNTER — LAB (OUTPATIENT)
Dept: LAB | Facility: LAB | Age: 19
End: 2024-09-16
Payer: COMMERCIAL

## 2024-09-16 DIAGNOSIS — Z3A.24 24 WEEKS GESTATION OF PREGNANCY (HHS-HCC): ICD-10-CM

## 2024-09-16 DIAGNOSIS — Z32.01 PREGNANCY CONFIRMED BY POSITIVE URINE TEST (HHS-HCC): ICD-10-CM

## 2024-09-16 DIAGNOSIS — Z34.01 PRIMIGRAVIDA, FIRST TRIMESTER (HHS-HCC): ICD-10-CM

## 2024-09-16 LAB
ERYTHROCYTE [DISTWIDTH] IN BLOOD BY AUTOMATED COUNT: 13.6 % (ref 11.5–14.5)
GLUCOSE 1H P 50 G GLC PO SERPL-MCNC: 96 MG/DL
HCT VFR BLD AUTO: 34.7 % (ref 36–46)
HGB BLD-MCNC: 11.7 G/DL (ref 12–16)
MCH RBC QN AUTO: 30.2 PG (ref 26–34)
MCHC RBC AUTO-ENTMCNC: 33.7 G/DL (ref 32–36)
MCV RBC AUTO: 90 FL (ref 80–100)
NRBC BLD-RTO: 0 /100 WBCS (ref 0–0)
PLATELET # BLD AUTO: 247 X10*3/UL (ref 150–450)
RBC # BLD AUTO: 3.87 X10*6/UL (ref 4–5.2)
WBC # BLD AUTO: 7.6 X10*3/UL (ref 4.4–11.3)

## 2024-09-16 PROCEDURE — 85027 COMPLETE CBC AUTOMATED: CPT

## 2024-09-16 PROCEDURE — 82947 ASSAY GLUCOSE BLOOD QUANT: CPT

## 2024-09-16 PROCEDURE — 86780 TREPONEMA PALLIDUM: CPT

## 2024-09-16 PROCEDURE — 36415 COLL VENOUS BLD VENIPUNCTURE: CPT

## 2024-09-17 ENCOUNTER — LAB REQUISITION (OUTPATIENT)
Dept: LAB | Facility: HOSPITAL | Age: 19
End: 2024-09-17
Payer: COMMERCIAL

## 2024-09-17 DIAGNOSIS — Z34.01 ENCOUNTER FOR SUPERVISION OF NORMAL FIRST PREGNANCY, FIRST TRIMESTER: ICD-10-CM

## 2024-09-17 LAB
REFLEX ADDED, ANEMIA PANEL: NORMAL
TREPONEMA PALLIDUM IGG+IGM AB [PRESENCE] IN SERUM OR PLASMA BY IMMUNOASSAY: NONREACTIVE

## 2024-10-01 ENCOUNTER — APPOINTMENT (OUTPATIENT)
Dept: OBSTETRICS AND GYNECOLOGY | Facility: CLINIC | Age: 19
End: 2024-10-01
Payer: COMMERCIAL

## 2024-10-01 VITALS — DIASTOLIC BLOOD PRESSURE: 58 MMHG | BODY MASS INDEX: 23.62 KG/M2 | SYSTOLIC BLOOD PRESSURE: 94 MMHG | WEIGHT: 125 LBS

## 2024-10-01 DIAGNOSIS — Z32.01 PREGNANCY TEST POSITIVE (HHS-HCC): Primary | ICD-10-CM

## 2024-10-01 DIAGNOSIS — Z3A.28 28 WEEKS GESTATION OF PREGNANCY (HHS-HCC): Primary | ICD-10-CM

## 2024-10-01 LAB
POC GLUCOSE, URINE: NEGATIVE MG/DL
POC PROTEIN, URINE: NEGATIVE MG/DL

## 2024-10-01 PROCEDURE — 0501F PRENATAL FLOW SHEET: CPT | Performed by: OBSTETRICS & GYNECOLOGY

## 2024-10-01 NOTE — PROGRESS NOTES
"Subjective   Patient ID [id number]   Horacio Dominguez is a 19 y.o.  at 28w2d with a working estimated date of delivery of 2024, by Last Menstrual Period who presents for a routine prenatal visit. She denies vaginal bleeding, leakage of fluid, decreased fetal movements, or contractions. Doing well.     Her pregnancy is complicated by:  None    Objective   Physical Exam:   Weight: 56.7 kg (125 lb)  Expected Total Weight Gain: 11.5 kg (25 lb)-16 kg (35 lb)   Pregravid BMI: 20.98  BP: 94/58  Fetal Heart Rate: 148 Fundal Height (cm): 28 cm             Prenatal Labs  Urine Dip:  Lab Results   Component Value Date    KETONESU 15 (1+) (A) 2024    GLUCOSEUR NEGATIVE 10/03/2023     Lab Results   Component Value Date    HGB 11.7 (L) 2024    HCT 34.7 (L) 2024    ABO B 2024    HEPBSAG Nonreactive 10/03/2023     No results found for: \"PAPPA\", \"AFP\", \"HCG\", \"ESTRIOL\", \"INHBA\"  No results found for: \"GLUF\", \"GLUT1\", \"FJJIKDD1ER\", \"XVRPKIX7CZ\"    Imaging  The most recent ultrasound was performed on   with a study GA of   and EFW of  .          Assessment/Plan   Problem List Items Addressed This Visit    None  Visit Diagnoses         Codes    28 weeks gestation of pregnancy ([facility])    -  Primary Z3A.28    Relevant Orders    POCT UA Automated manually resulted (Completed)          Continue prenatal vitamin.  Labs reviewed.  GBS N/A  Expected mode of delivery Vaginal delivery  Follow up in 2 weeks for a routine prenatal visit.  "

## 2024-10-15 ENCOUNTER — APPOINTMENT (OUTPATIENT)
Dept: OBSTETRICS AND GYNECOLOGY | Facility: CLINIC | Age: 19
End: 2024-10-15
Payer: COMMERCIAL

## 2024-10-15 VITALS — BODY MASS INDEX: 23.88 KG/M2 | SYSTOLIC BLOOD PRESSURE: 80 MMHG | WEIGHT: 126.4 LBS | DIASTOLIC BLOOD PRESSURE: 58 MMHG

## 2024-10-15 DIAGNOSIS — Z3A.30 30 WEEKS GESTATION OF PREGNANCY (HHS-HCC): Primary | ICD-10-CM

## 2024-10-15 PROCEDURE — 90715 TDAP VACCINE 7 YRS/> IM: CPT | Performed by: OBSTETRICS & GYNECOLOGY

## 2024-10-15 PROCEDURE — 90656 IIV3 VACC NO PRSV 0.5 ML IM: CPT | Performed by: OBSTETRICS & GYNECOLOGY

## 2024-10-15 PROCEDURE — 90471 IMMUNIZATION ADMIN: CPT | Performed by: OBSTETRICS & GYNECOLOGY

## 2024-10-15 PROCEDURE — 90472 IMMUNIZATION ADMIN EACH ADD: CPT | Performed by: OBSTETRICS & GYNECOLOGY

## 2024-10-15 PROCEDURE — 0501F PRENATAL FLOW SHEET: CPT | Performed by: OBSTETRICS & GYNECOLOGY

## 2024-10-15 NOTE — PROGRESS NOTES
"Subjective   Patient ID [id number]   Horacio Dominguez is a 19 y.o.  at 30w2d with a working estimated date of delivery of 2024, by Last Menstrual Period who presents for a routine prenatal visit. She denies vaginal bleeding, leakage of fluid, decreased fetal movements, or contractions.  Had some sharp pains lower abdomen yesterday morning on and off for couple of hours no other symptoms but it is gone.       Her pregnancy is complicated by:  None    Objective   Physical Exam:   Weight: 57.3 kg (126 lb 6.4 oz)  Expected Total Weight Gain: 11.5 kg (25 lb)-16 kg (35 lb)   Pregravid BMI: 20.98  BP: 80/58  Fetal Heart Rate: 151 Fundal Height (cm): 28 cm             Prenatal Labs  Urine Dip:  Lab Results   Component Value Date    KETONESU 15 (1+) (A) 2024    GLUCOSEUR NEGATIVE 10/03/2023     Lab Results   Component Value Date    HGB 11.7 (L) 2024    HCT 34.7 (L) 2024    ABO B 2024    HEPBSAG Nonreactive 10/03/2023     No results found for: \"PAPPA\", \"AFP\", \"HCG\", \"ESTRIOL\", \"INHBA\"  No results found for: \"GLUF\", \"GLUT1\", \"IKONKSL9GF\", \"QGAGDDD6FI\"    Imaging  The most recent ultrasound was performed on   with a study GA of   and EFW of  .          Assessment/Plan   Problem List Items Addressed This Visit    None  Visit Diagnoses         Codes    30 weeks gestation of pregnancy ([facility])    -  Primary Z3A.30    Relevant Orders    Flu vaccine, trivalent, preservative free, age 6 months and greater (Fluarix/Fluzone/Flulaval) (Completed)    Tdap vaccine, age 7 years and older  (BOOSTRIX) (Completed)          Continue prenatal vitamin.  Labs reviewed.  GBS   Expected mode of delivery Vaginal  Follow up in 2  week for a routine prenatal visit.  "

## 2024-10-29 ENCOUNTER — APPOINTMENT (OUTPATIENT)
Dept: OBSTETRICS AND GYNECOLOGY | Facility: CLINIC | Age: 19
End: 2024-10-29
Payer: COMMERCIAL

## 2024-10-29 VITALS — BODY MASS INDEX: 24.22 KG/M2 | SYSTOLIC BLOOD PRESSURE: 90 MMHG | DIASTOLIC BLOOD PRESSURE: 62 MMHG | WEIGHT: 128.2 LBS

## 2024-10-29 DIAGNOSIS — Z3A.32 32 WEEKS GESTATION OF PREGNANCY (HHS-HCC): Primary | ICD-10-CM

## 2024-10-29 LAB
POC GLUCOSE, URINE: NEGATIVE MG/DL
POC PROTEIN, URINE: NEGATIVE MG/DL

## 2024-10-29 PROCEDURE — 0501F PRENATAL FLOW SHEET: CPT | Performed by: OBSTETRICS & GYNECOLOGY

## 2024-11-12 ENCOUNTER — APPOINTMENT (OUTPATIENT)
Dept: OBSTETRICS AND GYNECOLOGY | Facility: CLINIC | Age: 19
End: 2024-11-12
Payer: COMMERCIAL

## 2024-11-12 VITALS — WEIGHT: 130 LBS | BODY MASS INDEX: 24.56 KG/M2 | DIASTOLIC BLOOD PRESSURE: 68 MMHG | SYSTOLIC BLOOD PRESSURE: 96 MMHG

## 2024-11-12 DIAGNOSIS — Z3A.34 34 WEEKS GESTATION OF PREGNANCY (HHS-HCC): Primary | ICD-10-CM

## 2024-11-12 PROCEDURE — 0501F PRENATAL FLOW SHEET: CPT | Performed by: OBSTETRICS & GYNECOLOGY

## 2024-11-12 NOTE — PROGRESS NOTES
"Subjective   Patient ID [id number]   Horacio Dominguez is a 19 y.o.  at 34w2d with a working estimated date of delivery of 2024, by Last Menstrual Period who presents for a routine prenatal visit. She denies vaginal bleeding, leakage of fluid, decreased fetal movements, or contractions.  Doing well reports some lower abdominal discomfort and cramping feeling he send bleeding sensation occasionally vaginal associated with fetal movement.  She says 1 day baby is moving less but next day it improved.  No other warning signs.    Her pregnancy is complicated by:  None    Objective   Physical Exam:   Weight: 59 kg (130 lb)  Expected Total Weight Gain: 11.5 kg (25 lb)-16 kg (35 lb)   Pregravid BMI: 20.98  BP: 96/68  Fetal Heart Rate: 148               Prenatal Labs  Urine Dip:  Lab Results   Component Value Date    KETONESU 15 (1+) (A) 2024    GLUCOSEUR NEGATIVE 10/03/2023     Lab Results   Component Value Date    HGB 11.7 (L) 2024    HCT 34.7 (L) 2024    ABO B 2024    HEPBSAG Nonreactive 10/03/2023     No results found for: \"PAPPA\", \"AFP\", \"HCG\", \"ESTRIOL\", \"INHBA\"  No results found for: \"GLUF\", \"GLUT1\", \"NQGFQVT7YN\", \"ZJPMEDY5JG\"    Imaging  The most recent ultrasound was performed on The most recent ultrasound study is not finalized with a study GA of The most recent ultrasound study is not finalized and EFW of The most recent ultrasound study is not finalized.  The most recent ultrasound study is not finalized  The most recent ultrasound study is not finalized    Assessment/Plan   Problem List Items Addressed This Visit    None  Visit Diagnoses         Codes    34 weeks gestation of pregnancy ([facility])    -  Primary Z3A.34    Relevant Orders    POCT UA Automated manually resulted          Continue prenatal vitamin.  U/S and RSV vaccine in 2 weeks.    Labs reviewed.  GBS next visit.   Expected mode of delivery Vaginal.  Likes to be induced at 40 weeks.   Follow up in 2 week for a " routine prenatal visit.

## 2024-11-26 ENCOUNTER — APPOINTMENT (OUTPATIENT)
Dept: OBSTETRICS AND GYNECOLOGY | Facility: CLINIC | Age: 19
End: 2024-11-26
Payer: COMMERCIAL

## 2024-11-26 ENCOUNTER — HOSPITAL ENCOUNTER (OUTPATIENT)
Dept: RADIOLOGY | Facility: CLINIC | Age: 19
Discharge: HOME | End: 2024-11-26
Payer: COMMERCIAL

## 2024-11-26 ENCOUNTER — CLINICAL SUPPORT (OUTPATIENT)
Dept: OBSTETRICS AND GYNECOLOGY | Facility: CLINIC | Age: 19
End: 2024-11-26
Payer: COMMERCIAL

## 2024-11-26 VITALS — BODY MASS INDEX: 25.24 KG/M2 | WEIGHT: 133.6 LBS | SYSTOLIC BLOOD PRESSURE: 90 MMHG | DIASTOLIC BLOOD PRESSURE: 70 MMHG

## 2024-11-26 DIAGNOSIS — Z34.91 FIRST TRIMESTER PREGNANCY (HHS-HCC): ICD-10-CM

## 2024-11-26 DIAGNOSIS — Z29.11 NEED FOR RSV IMMUNIZATION: ICD-10-CM

## 2024-11-26 DIAGNOSIS — O36.5931 IUGR (INTRAUTERINE GROWTH RESTRICTION) AFFECTING CARE OF MOTHER, THIRD TRIMESTER, FETUS 1 (HHS-HCC): ICD-10-CM

## 2024-11-26 DIAGNOSIS — Z3A.36 36 WEEKS GESTATION OF PREGNANCY (HHS-HCC): Primary | ICD-10-CM

## 2024-11-26 DIAGNOSIS — O36.5930 MATERNAL CARE FOR OTHER KNOWN OR SUSPECTED POOR FETAL GROWTH, THIRD TRIMESTER, NOT APPLICABLE OR UNSPECIFIED: ICD-10-CM

## 2024-11-26 LAB
POC GLUCOSE, URINE: NEGATIVE MG/DL
POC PROTEIN, URINE: NEGATIVE MG/DL

## 2024-11-26 PROCEDURE — 87081 CULTURE SCREEN ONLY: CPT

## 2024-11-26 PROCEDURE — 90678 RSV VACC PREF BIVALENT IM: CPT | Performed by: OBSTETRICS & GYNECOLOGY

## 2024-11-26 PROCEDURE — 76819 FETAL BIOPHYS PROFIL W/O NST: CPT | Performed by: MEDICAL GENETICS

## 2024-11-26 PROCEDURE — 76819 FETAL BIOPHYS PROFIL W/O NST: CPT

## 2024-11-26 PROCEDURE — 76820 UMBILICAL ARTERY ECHO: CPT

## 2024-11-26 PROCEDURE — 76816 OB US FOLLOW-UP PER FETUS: CPT | Performed by: MEDICAL GENETICS

## 2024-11-26 PROCEDURE — 0501F PRENATAL FLOW SHEET: CPT | Performed by: OBSTETRICS & GYNECOLOGY

## 2024-11-26 PROCEDURE — 90471 IMMUNIZATION ADMIN: CPT | Performed by: OBSTETRICS & GYNECOLOGY

## 2024-11-26 PROCEDURE — 76820 UMBILICAL ARTERY ECHO: CPT | Performed by: MEDICAL GENETICS

## 2024-11-26 PROCEDURE — 76816 OB US FOLLOW-UP PER FETUS: CPT

## 2024-11-26 NOTE — PROGRESS NOTES
Patient here for RSV vaccine. RSV vaccine information provided. RSV vaccine information reviewed with primary OB providers. RSV vaccine given in left deltoid. Patient denies history of Guillain-Lawrence Syndrome, denies acute illness with or without fever, and denies immunocompromised.

## 2024-11-26 NOTE — PROGRESS NOTES
"Subjective   Patient ID [id number]   Horacio Dominguez is a 19 y.o.  at 36w2d with a working estimated date of delivery of 2024, by Last Menstrual Period who presents for a routine prenatal visit. She denies vaginal bleeding, leakage of fluid, decreased fetal movements, or contractions.  Had  U/S this am told baby SGA BPP normal.   She is rescheduled for another BPP next Monday.       Her pregnancy is complicated by:  SGA    Objective   Physical Exam:   Weight: 60.6 kg (133 lb 9.6 oz)  Expected Total Weight Gain: 11.5 kg (25 lb)-16 kg (35 lb)   Pregravid BMI: 20.98  BP: 90/70  Fetal Heart Rate: 165               Prenatal Labs  Urine Dip:  Lab Results   Component Value Date    KETONESU 15 (1+) (A) 2024    GLUCOSEUR NEGATIVE 10/03/2023     Lab Results   Component Value Date    HGB 11.7 (L) 2024    HCT 34.7 (L) 2024    ABO B 2024    HEPBSAG Nonreactive 10/03/2023     No results found for: \"PAPPA\", \"AFP\", \"HCG\", \"ESTRIOL\", \"INHBA\"  No results found for: \"GLUF\", \"GLUT1\", \"PEUFBDB2JR\", \"ATTDYSC7JQ\"    Imaging  The most recent ultrasound was performed on The most recent ultrasound study is not finalized with a study GA of The most recent ultrasound study is not finalized and EFW of The most recent ultrasound study is not finalized.  The most recent ultrasound study is not finalized  The most recent ultrasound study is not finalized    Assessment/Plan   Problem List Items Addressed This Visit    None  Visit Diagnoses         Codes    36 weeks gestation of pregnancy ([facility])    -  Primary Z3A.36    Relevant Orders    Group B Streptococcus (GBS) Prenatal Screen, Culture    POCT UA Automated manually resulted (Completed)          Continue prenatal vitamin.  Labs reviewed.  GBS taken.  Expected mode of delivery Vaginal  Follow up in 1 week for a routine prenatal visit.  "

## 2024-11-29 LAB — GP B STREP GENITAL QL CULT: NORMAL

## 2024-12-02 ENCOUNTER — HOSPITAL ENCOUNTER (OUTPATIENT)
Dept: RADIOLOGY | Facility: CLINIC | Age: 19
Discharge: HOME | End: 2024-12-02
Payer: COMMERCIAL

## 2024-12-02 DIAGNOSIS — Z34.91 FIRST TRIMESTER PREGNANCY (HHS-HCC): ICD-10-CM

## 2024-12-02 PROCEDURE — 76819 FETAL BIOPHYS PROFIL W/O NST: CPT | Performed by: STUDENT IN AN ORGANIZED HEALTH CARE EDUCATION/TRAINING PROGRAM

## 2024-12-02 PROCEDURE — 76820 UMBILICAL ARTERY ECHO: CPT | Performed by: STUDENT IN AN ORGANIZED HEALTH CARE EDUCATION/TRAINING PROGRAM

## 2024-12-02 PROCEDURE — 76819 FETAL BIOPHYS PROFIL W/O NST: CPT

## 2024-12-02 PROCEDURE — 76820 UMBILICAL ARTERY ECHO: CPT

## 2024-12-03 ENCOUNTER — APPOINTMENT (OUTPATIENT)
Dept: OBSTETRICS AND GYNECOLOGY | Facility: CLINIC | Age: 19
End: 2024-12-03
Payer: COMMERCIAL

## 2024-12-03 VITALS — BODY MASS INDEX: 25.85 KG/M2 | DIASTOLIC BLOOD PRESSURE: 70 MMHG | SYSTOLIC BLOOD PRESSURE: 104 MMHG | WEIGHT: 136.8 LBS

## 2024-12-03 DIAGNOSIS — Z3A.37 37 WEEKS GESTATION OF PREGNANCY (HHS-HCC): Primary | ICD-10-CM

## 2024-12-03 LAB
POC GLUCOSE, URINE: NEGATIVE MG/DL
POC PROTEIN, URINE: NEGATIVE MG/DL

## 2024-12-03 PROCEDURE — 0501F PRENATAL FLOW SHEET: CPT | Performed by: OBSTETRICS & GYNECOLOGY

## 2024-12-03 NOTE — PROGRESS NOTES
"Subjective   Patient ID [id number]   Horacio Dominguez is a 19 y.o.  at 37w2d with a working estimated date of delivery of 2024, by Last Menstrual Period who presents for a routine prenatal visit. She denies vaginal bleeding, leakage of fluid, decreased fetal movements, or contractions.  Doing well.      Her pregnancy is complicated by:  IUGR    Objective   Physical Exam:   Weight: 62.1 kg (136 lb 12.8 oz)  Expected Total Weight Gain: 11.5 kg (25 lb)-16 kg (35 lb)   Pregravid BMI: 20.98  BP: 104/70  Fetal Heart Rate: 155               Prenatal Labs  Urine Dip:  Lab Results   Component Value Date    KETONESU 15 (1+) (A) 2024    GLUCOSEUR NEGATIVE 10/03/2023     Lab Results   Component Value Date    HGB 11.7 (L) 2024    HCT 34.7 (L) 2024    ABO B 2024    HEPBSAG Nonreactive 10/03/2023     No results found for: \"PAPPA\", \"AFP\", \"HCG\", \"ESTRIOL\", \"INHBA\"  No results found for: \"GLUF\", \"GLUT1\", \"VOOAGDW2NF\", \"ZSYIATN8PO\"    Imaging  The most recent ultrasound was performed on The most recent ultrasound study is not finalized with a study GA of The most recent ultrasound study is not finalized and EFW of The most recent ultrasound study is not finalized.  The most recent ultrasound study is not finalized  The most recent ultrasound study is not finalized    Assessment/Plan   Problem List Items Addressed This Visit    None  Visit Diagnoses         Codes    37 weeks gestation of pregnancy ([facility])    -  Primary Z3A.37    Relevant Orders    POCT UA Automated manually resulted (Completed)          Continue prenatal vitamin.   Labs reviewed.  GBS negative.    Expected mode of delivery Vaginal.    Follow up in 1 week for a routine prenatal visit.  "

## 2024-12-10 ENCOUNTER — HOSPITAL ENCOUNTER (OUTPATIENT)
Dept: RADIOLOGY | Facility: CLINIC | Age: 19
Discharge: HOME | End: 2024-12-10
Payer: COMMERCIAL

## 2024-12-10 ENCOUNTER — APPOINTMENT (OUTPATIENT)
Dept: OBSTETRICS AND GYNECOLOGY | Facility: CLINIC | Age: 19
End: 2024-12-10
Payer: COMMERCIAL

## 2024-12-10 VITALS — WEIGHT: 136.8 LBS | BODY MASS INDEX: 25.85 KG/M2 | SYSTOLIC BLOOD PRESSURE: 92 MMHG | DIASTOLIC BLOOD PRESSURE: 70 MMHG

## 2024-12-10 DIAGNOSIS — Z3A.39 39 WEEKS GESTATION OF PREGNANCY (HHS-HCC): ICD-10-CM

## 2024-12-10 DIAGNOSIS — O36.5931 IUGR (INTRAUTERINE GROWTH RESTRICTION) AFFECTING CARE OF MOTHER, THIRD TRIMESTER, FETUS 1 (HHS-HCC): ICD-10-CM

## 2024-12-10 DIAGNOSIS — O36.5930 MATERNAL CARE FOR OTHER KNOWN OR SUSPECTED POOR FETAL GROWTH, THIRD TRIMESTER, NOT APPLICABLE OR UNSPECIFIED: ICD-10-CM

## 2024-12-10 DIAGNOSIS — O36.5930 POOR FETAL GROWTH AFFECTING MANAGEMENT OF MOTHER IN THIRD TRIMESTER, SINGLE OR UNSPECIFIED FETUS (HHS-HCC): ICD-10-CM

## 2024-12-10 DIAGNOSIS — Z34.91 FIRST TRIMESTER PREGNANCY (HHS-HCC): ICD-10-CM

## 2024-12-10 DIAGNOSIS — Z3A.38 38 WEEKS GESTATION OF PREGNANCY (HHS-HCC): Primary | ICD-10-CM

## 2024-12-10 LAB
POC GLUCOSE, URINE: NEGATIVE MG/DL
POC PROTEIN, URINE: NEGATIVE MG/DL

## 2024-12-10 PROCEDURE — 76820 UMBILICAL ARTERY ECHO: CPT | Performed by: STUDENT IN AN ORGANIZED HEALTH CARE EDUCATION/TRAINING PROGRAM

## 2024-12-10 PROCEDURE — 0501F PRENATAL FLOW SHEET: CPT | Performed by: OBSTETRICS & GYNECOLOGY

## 2024-12-10 PROCEDURE — 76815 OB US LIMITED FETUS(S): CPT | Performed by: STUDENT IN AN ORGANIZED HEALTH CARE EDUCATION/TRAINING PROGRAM

## 2024-12-10 PROCEDURE — 76819 FETAL BIOPHYS PROFIL W/O NST: CPT | Performed by: STUDENT IN AN ORGANIZED HEALTH CARE EDUCATION/TRAINING PROGRAM

## 2024-12-10 PROCEDURE — 76815 OB US LIMITED FETUS(S): CPT

## 2024-12-10 PROCEDURE — 76820 UMBILICAL ARTERY ECHO: CPT

## 2024-12-10 NOTE — PROGRESS NOTES
"Subjective   Patient ID [id number]   Horacio Dominguez is a 19 y.o.  at 38w2d with a working estimated date of delivery of 2024, by Last Menstrual Period who presents for a routine prenatal visit. She denies vaginal bleeding, leakage of fluid, decreased fetal movements, or contractions.  Doing well.     Her pregnancy is complicated by:  IUGR    Objective   Physical Exam:   Weight: 62.1 kg (136 lb 12.8 oz)  Expected Total Weight Gain: 11.5 kg (25 lb)-16 kg (35 lb)   Pregravid BMI: 20.98  BP: 92/70  Fetal Heart Rate: 152               Prenatal Labs  Urine Dip:  Lab Results   Component Value Date    KETONESU 15 (1+) (A) 2024    GLUCOSEUR NEGATIVE 10/03/2023     Lab Results   Component Value Date    HGB 11.7 (L) 2024    HCT 34.7 (L) 2024    ABO B 2024    HEPBSAG Nonreactive 10/03/2023     No results found for: \"PAPPA\", \"AFP\", \"HCG\", \"ESTRIOL\", \"INHBA\"  No results found for: \"GLUF\", \"GLUT1\", \"KGLTFYE0HR\", \"AHXOFRU3NW\"    Imaging  The most recent ultrasound was performed on   with a study GA of   and EFW of  .          Assessment/Plan   Problem List Items Addressed This Visit    None  Visit Diagnoses         Codes    38 weeks gestation of pregnancy ([facility])    -  Primary Z3A.38    Relevant Orders    POCT UA Automated manually resulted (Completed)          Continue prenatal vitamin.  Labs reviewed.  GBS taken.  Expected mode of delivery VAGINAL INDUCTION 2024.  Follow up in 1 week for a routine prenatal visit.  "

## 2024-12-17 ENCOUNTER — APPOINTMENT (OUTPATIENT)
Dept: OBSTETRICS AND GYNECOLOGY | Facility: CLINIC | Age: 19
End: 2024-12-17
Payer: COMMERCIAL

## 2024-12-17 ENCOUNTER — HOSPITAL ENCOUNTER (OUTPATIENT)
Dept: RADIOLOGY | Facility: CLINIC | Age: 19
Discharge: HOME | End: 2024-12-17
Payer: COMMERCIAL

## 2024-12-17 VITALS — SYSTOLIC BLOOD PRESSURE: 90 MMHG | BODY MASS INDEX: 26.07 KG/M2 | DIASTOLIC BLOOD PRESSURE: 70 MMHG | WEIGHT: 138 LBS

## 2024-12-17 DIAGNOSIS — Z34.91 FIRST TRIMESTER PREGNANCY (HHS-HCC): ICD-10-CM

## 2024-12-17 DIAGNOSIS — Z3A.39 39 WEEKS GESTATION OF PREGNANCY (HHS-HCC): ICD-10-CM

## 2024-12-17 PROBLEM — O03.9 COMPLETE OR UNSPECIFIED SPONTANEOUS ABORTION WITHOUT COMPLICATION: Status: RESOLVED | Noted: 2023-11-07 | Resolved: 2024-12-17

## 2024-12-17 PROCEDURE — 76816 OB US FOLLOW-UP PER FETUS: CPT

## 2024-12-17 PROCEDURE — 0501F PRENATAL FLOW SHEET: CPT | Performed by: OBSTETRICS & GYNECOLOGY

## 2024-12-17 PROCEDURE — 76819 FETAL BIOPHYS PROFIL W/O NST: CPT | Performed by: MEDICAL GENETICS

## 2024-12-17 PROCEDURE — 76816 OB US FOLLOW-UP PER FETUS: CPT | Performed by: MEDICAL GENETICS

## 2024-12-17 PROCEDURE — 76819 FETAL BIOPHYS PROFIL W/O NST: CPT

## 2024-12-17 NOTE — PROGRESS NOTES
"Subjective   Patient ID [id number]   Horacio Dominguez is a 19 y.o.  at 39w2d with a working estimated date of delivery of 2024, by Last Menstrual Period who presents for a routine prenatal visit. She denies vaginal bleeding, leakage of fluid, decreased fetal movements, or contractions.  Doing well.     Her pregnancy is complicated by:  SGA    Objective   Physical Exam:   Weight: 62.6 kg (138 lb)  Expected Total Weight Gain: 11.5 kg (25 lb)-16 kg (35 lb)   Pregravid BMI: 20.98  BP: 90/70  Fetal Heart Rate: 153               Prenatal Labs  Urine Dip:  Lab Results   Component Value Date    KETONESU 15 (1+) (A) 2024    GLUCOSEUR NEGATIVE 10/03/2023     Lab Results   Component Value Date    HGB 11.7 (L) 2024    HCT 34.7 (L) 2024    ABO B 2024    HEPBSAG Nonreactive 10/03/2023     No results found for: \"PAPPA\", \"AFP\", \"HCG\", \"ESTRIOL\", \"INHBA\"  No results found for: \"GLUF\", \"GLUT1\", \"GKDFQMC7RZ\", \"HJOKNLD7WE\"  Patient Active Problem List   Diagnosis    Dysmenorrhea    Poor fetal growth affecting management of mother in third trimester ([facility])    39 weeks gestation of pregnancy ([facility])    Desired provider in labor: [] CNM  [x] Physician  [] Blood Products: [] Yes, accepts [] No, needs counseling  [x] Initial BMI: 20.98   [] Prenatal Labs:   [] Cervical Cancer Screening up to date  [x] Rh status:   [] Genetic Screening:    [] NT US: (11-13 wks)  [] Baby ASA (if indicated):  [] Pregnancy dated by:     [] Anatomy US: (19-20 wks)  [] Federal Sterilization consent signed (if indicated):  [x] 1hr GCT at 24-28wks:  [] Rhogam (if indicated):   [] Fetal Surveillance (if indicated):  [x] Tdap (27-32 wks, may be given up to 36 wks if initial window missed):   [x] RSV (32-36 wks) (Sept. to end ):   [x] Flu Vaccine:    [x] Breastfeeding:  [x] Postpartum Birth control method:   [x] GBS at 36 - 37 wks:  [x] 39 weeks discussion of IOL vs. Expectant management:  [x] Mode of delivery ( " anticipated ):   Imaging  The most recent ultrasound was performed on The most recent ultrasound study is not finalized with a study GA of The most recent ultrasound study is not finalized and EFW of The most recent ultrasound study is not finalized.  The most recent ultrasound study is not finalized  The most recent ultrasound study is not finalized    Assessment/Plan   Problem List Items Addressed This Visit             ICD-10-CM    39 weeks gestation of pregnancy ([facility]) Z3A.39     Continue prenatal vitamin.  Labs reviewed.  GBS negative  Expected mode of delivery Vaginal  Follow up in 1 week for a routine prenatal visit. Induction tomorrow.

## 2024-12-17 NOTE — PROGRESS NOTES
Problem List Items Addressed This Visit          Ob-Gyn Problems    39 weeks gestation of pregnancy ([facility])    Overview     Desired provider in labor: [] CNM  [] Physician  [] Blood Products: [] Yes, accepts [] No, needs counseling  [x] Initial BMI: 20.98   [] Prenatal Labs:   [] Cervical Cancer Screening up to date  [] Rh status:   [] Genetic Screening:    [] NT US: (11-13 wks)  [] Baby ASA (if indicated):  [] Pregnancy dated by:     [] Anatomy US: (19-20 wks)  [] Federal Sterilization consent signed (if indicated):  [] 1hr GCT at 24-28wks:  [] Rhogam (if indicated):   [] Fetal Surveillance (if indicated):  [] Tdap (27-32 wks, may be given up to 36 wks if initial window missed):   [] RSV (32-36 wks) (Sept. to end of Jan):   [] Flu Vaccine:    [] Breastfeeding:  [] Postpartum Birth control method:   [] GBS at 36 - 37 wks:  [] 39 weeks discussion of IOL vs. Expectant management:  [] Mode of delivery ( anticipated ):

## 2024-12-18 ENCOUNTER — HOSPITAL ENCOUNTER (INPATIENT)
Facility: HOSPITAL | Age: 19
LOS: 3 days | Discharge: HOME | End: 2024-12-21
Attending: OBSTETRICS & GYNECOLOGY | Admitting: ADVANCED PRACTICE MIDWIFE
Payer: COMMERCIAL

## 2024-12-18 ENCOUNTER — APPOINTMENT (OUTPATIENT)
Dept: OBSTETRICS AND GYNECOLOGY | Facility: HOSPITAL | Age: 19
End: 2024-12-18
Payer: COMMERCIAL

## 2024-12-18 DIAGNOSIS — O36.5930 POOR FETAL GROWTH AFFECTING MANAGEMENT OF MOTHER IN THIRD TRIMESTER, SINGLE OR UNSPECIFIED FETUS (HHS-HCC): ICD-10-CM

## 2024-12-18 DIAGNOSIS — Z3A.39 39 WEEKS GESTATION OF PREGNANCY (HHS-HCC): ICD-10-CM

## 2024-12-18 PROBLEM — N94.6 DYSMENORRHEA: Status: RESOLVED | Noted: 2023-09-26 | Resolved: 2024-12-18

## 2024-12-18 PROBLEM — Z34.90 ENCOUNTER FOR INDUCTION OF LABOR: Status: ACTIVE | Noted: 2024-12-18

## 2024-12-18 LAB
ABO GROUP (TYPE) IN BLOOD: NORMAL
ANTIBODY SCREEN: NORMAL
ERYTHROCYTE [DISTWIDTH] IN BLOOD BY AUTOMATED COUNT: 14.4 % (ref 11.5–14.5)
HCT VFR BLD AUTO: 35.7 % (ref 36–46)
HGB BLD-MCNC: 12.3 G/DL (ref 12–16)
MCH RBC QN AUTO: 30.6 PG (ref 26–34)
MCHC RBC AUTO-ENTMCNC: 34.5 G/DL (ref 32–36)
MCV RBC AUTO: 89 FL (ref 80–100)
NRBC BLD-RTO: 0 /100 WBCS (ref 0–0)
PLATELET # BLD AUTO: 201 X10*3/UL (ref 150–450)
RBC # BLD AUTO: 4.02 X10*6/UL (ref 4–5.2)
RH FACTOR (ANTIGEN D): NORMAL
WBC # BLD AUTO: 9.2 X10*3/UL (ref 4.4–11.3)

## 2024-12-18 PROCEDURE — 87389 HIV-1 AG W/HIV-1&-2 AB AG IA: CPT | Mod: AHULAB | Performed by: OBSTETRICS & GYNECOLOGY

## 2024-12-18 PROCEDURE — 36415 COLL VENOUS BLD VENIPUNCTURE: CPT | Performed by: ADVANCED PRACTICE MIDWIFE

## 2024-12-18 PROCEDURE — 86317 IMMUNOASSAY INFECTIOUS AGENT: CPT | Mod: AHULAB | Performed by: OBSTETRICS & GYNECOLOGY

## 2024-12-18 PROCEDURE — 85027 COMPLETE CBC AUTOMATED: CPT | Performed by: ADVANCED PRACTICE MIDWIFE

## 2024-12-18 PROCEDURE — 2500000001 HC RX 250 WO HCPCS SELF ADMINISTERED DRUGS (ALT 637 FOR MEDICARE OP): Performed by: OBSTETRICS & GYNECOLOGY

## 2024-12-18 PROCEDURE — 86901 BLOOD TYPING SEROLOGIC RH(D): CPT | Performed by: ADVANCED PRACTICE MIDWIFE

## 2024-12-18 PROCEDURE — 86780 TREPONEMA PALLIDUM: CPT | Mod: AHULAB | Performed by: ADVANCED PRACTICE MIDWIFE

## 2024-12-18 PROCEDURE — 1120000001 HC OB PRIVATE ROOM DAILY

## 2024-12-18 PROCEDURE — 2500000004 HC RX 250 GENERAL PHARMACY W/ HCPCS (ALT 636 FOR OP/ED): Performed by: ADVANCED PRACTICE MIDWIFE

## 2024-12-18 RX ORDER — OXYTOCIN 10 [USP'U]/ML
10 INJECTION, SOLUTION INTRAMUSCULAR; INTRAVENOUS ONCE AS NEEDED
Status: COMPLETED | OUTPATIENT
Start: 2024-12-18 | End: 2024-12-19

## 2024-12-18 RX ORDER — FENTANYL/ROPIVACAINE/NS/PF 2MCG/ML-.2
0-25 PLASTIC BAG, INJECTION (ML) INJECTION CONTINUOUS
Status: DISCONTINUED | OUTPATIENT
Start: 2024-12-18 | End: 2024-12-19

## 2024-12-18 RX ORDER — OXYTOCIN 10 [USP'U]/ML
10 INJECTION, SOLUTION INTRAMUSCULAR; INTRAVENOUS ONCE AS NEEDED
Status: DISCONTINUED | OUTPATIENT
Start: 2024-12-18 | End: 2024-12-19

## 2024-12-18 RX ORDER — TERBUTALINE SULFATE 1 MG/ML
0.25 INJECTION SUBCUTANEOUS ONCE AS NEEDED
Status: DISCONTINUED | OUTPATIENT
Start: 2024-12-18 | End: 2024-12-19

## 2024-12-18 RX ORDER — NIFEDIPINE 10 MG/1
10 CAPSULE ORAL ONCE AS NEEDED
Status: DISCONTINUED | OUTPATIENT
Start: 2024-12-18 | End: 2024-12-19

## 2024-12-18 RX ORDER — LOPERAMIDE HYDROCHLORIDE 2 MG/1
4 CAPSULE ORAL EVERY 2 HOUR PRN
Status: DISCONTINUED | OUTPATIENT
Start: 2024-12-18 | End: 2024-12-19

## 2024-12-18 RX ORDER — TRANEXAMIC ACID 100 MG/ML
1000 INJECTION, SOLUTION INTRAVENOUS ONCE AS NEEDED
Status: DISCONTINUED | OUTPATIENT
Start: 2024-12-18 | End: 2024-12-19

## 2024-12-18 RX ORDER — OXYTOCIN/0.9 % SODIUM CHLORIDE 30/500 ML
60 PLASTIC BAG, INJECTION (ML) INTRAVENOUS ONCE AS NEEDED
Status: DISCONTINUED | OUTPATIENT
Start: 2024-12-18 | End: 2024-12-19

## 2024-12-18 RX ORDER — MISOPROSTOL 200 UG/1
800 TABLET ORAL ONCE AS NEEDED
Status: DISCONTINUED | OUTPATIENT
Start: 2024-12-18 | End: 2024-12-19

## 2024-12-18 RX ORDER — ONDANSETRON HYDROCHLORIDE 2 MG/ML
4 INJECTION, SOLUTION INTRAVENOUS EVERY 6 HOURS PRN
Status: DISCONTINUED | OUTPATIENT
Start: 2024-12-18 | End: 2024-12-19

## 2024-12-18 RX ORDER — METHYLERGONOVINE MALEATE 0.2 MG/ML
0.2 INJECTION INTRAVENOUS ONCE AS NEEDED
Status: DISCONTINUED | OUTPATIENT
Start: 2024-12-18 | End: 2024-12-19

## 2024-12-18 RX ORDER — LIDOCAINE HYDROCHLORIDE 10 MG/ML
30 INJECTION, SOLUTION INFILTRATION; PERINEURAL ONCE AS NEEDED
Status: DISCONTINUED | OUTPATIENT
Start: 2024-12-18 | End: 2024-12-19

## 2024-12-18 RX ORDER — ONDANSETRON 4 MG/1
4 TABLET, FILM COATED ORAL EVERY 6 HOURS PRN
Status: DISCONTINUED | OUTPATIENT
Start: 2024-12-18 | End: 2024-12-19

## 2024-12-18 RX ORDER — LABETALOL HYDROCHLORIDE 5 MG/ML
20 INJECTION, SOLUTION INTRAVENOUS ONCE AS NEEDED
Status: DISCONTINUED | OUTPATIENT
Start: 2024-12-18 | End: 2024-12-19

## 2024-12-18 RX ORDER — HYDRALAZINE HYDROCHLORIDE 20 MG/ML
5 INJECTION INTRAMUSCULAR; INTRAVENOUS ONCE AS NEEDED
Status: DISCONTINUED | OUTPATIENT
Start: 2024-12-18 | End: 2024-12-19

## 2024-12-18 RX ORDER — METOCLOPRAMIDE 10 MG/1
10 TABLET ORAL EVERY 6 HOURS PRN
Status: DISCONTINUED | OUTPATIENT
Start: 2024-12-18 | End: 2024-12-19

## 2024-12-18 RX ORDER — METOCLOPRAMIDE HYDROCHLORIDE 5 MG/ML
10 INJECTION INTRAMUSCULAR; INTRAVENOUS EVERY 6 HOURS PRN
Status: DISCONTINUED | OUTPATIENT
Start: 2024-12-18 | End: 2024-12-19

## 2024-12-18 RX ORDER — CARBOPROST TROMETHAMINE 250 UG/ML
250 INJECTION, SOLUTION INTRAMUSCULAR ONCE AS NEEDED
Status: DISCONTINUED | OUTPATIENT
Start: 2024-12-18 | End: 2024-12-19

## 2024-12-18 SDOH — ECONOMIC STABILITY: TRANSPORTATION INSECURITY: IN THE PAST 12 MONTHS, HAS LACK OF TRANSPORTATION KEPT YOU FROM MEDICAL APPOINTMENTS OR FROM GETTING MEDICATIONS?: NO

## 2024-12-18 SDOH — SOCIAL STABILITY: SOCIAL INSECURITY
WITHIN THE LAST YEAR, HAVE YOU BEEN RAPED OR FORCED TO HAVE ANY KIND OF SEXUAL ACTIVITY BY YOUR PARTNER OR EX-PARTNER?: NO

## 2024-12-18 SDOH — ECONOMIC STABILITY: FOOD INSECURITY: WITHIN THE PAST 12 MONTHS, YOU WORRIED THAT YOUR FOOD WOULD RUN OUT BEFORE YOU GOT THE MONEY TO BUY MORE.: NEVER TRUE

## 2024-12-18 SDOH — HEALTH STABILITY: MENTAL HEALTH: NON-SPECIFIC ACTIVE SUICIDAL THOUGHTS (PAST 1 MONTH): NO

## 2024-12-18 SDOH — SOCIAL STABILITY: SOCIAL INSECURITY: VERBAL ABUSE: DENIES

## 2024-12-18 SDOH — SOCIAL STABILITY: SOCIAL INSECURITY: WITHIN THE LAST YEAR, HAVE YOU BEEN AFRAID OF YOUR PARTNER OR EX-PARTNER?: NO

## 2024-12-18 SDOH — SOCIAL STABILITY: SOCIAL INSECURITY: WITHIN THE LAST YEAR, HAVE YOU BEEN HUMILIATED OR EMOTIONALLY ABUSED IN OTHER WAYS BY YOUR PARTNER OR EX-PARTNER?: NO

## 2024-12-18 SDOH — SOCIAL STABILITY: SOCIAL INSECURITY
WITHIN THE LAST YEAR, HAVE YOU BEEN KICKED, HIT, SLAPPED, OR OTHERWISE PHYSICALLY HURT BY YOUR PARTNER OR EX-PARTNER?: NO

## 2024-12-18 SDOH — ECONOMIC STABILITY: HOUSING INSECURITY: DO YOU FEEL UNSAFE GOING BACK TO THE PLACE WHERE YOU ARE LIVING?: NO

## 2024-12-18 SDOH — ECONOMIC STABILITY: FOOD INSECURITY: WITHIN THE PAST 12 MONTHS, THE FOOD YOU BOUGHT JUST DIDN'T LAST AND YOU DIDN'T HAVE MONEY TO GET MORE.: NEVER TRUE

## 2024-12-18 SDOH — ECONOMIC STABILITY: FOOD INSECURITY: HOW HARD IS IT FOR YOU TO PAY FOR THE VERY BASICS LIKE FOOD, HOUSING, MEDICAL CARE, AND HEATING?: NOT HARD AT ALL

## 2024-12-18 SDOH — SOCIAL STABILITY: SOCIAL INSECURITY: DOES ANYONE TRY TO KEEP YOU FROM HAVING/CONTACTING OTHER FRIENDS OR DOING THINGS OUTSIDE YOUR HOME?: NO

## 2024-12-18 SDOH — SOCIAL STABILITY: SOCIAL INSECURITY: HAVE YOU HAD ANY THOUGHTS OF HARMING ANYONE ELSE?: NO

## 2024-12-18 SDOH — SOCIAL STABILITY: SOCIAL INSECURITY: HAVE YOU HAD THOUGHTS OF HARMING ANYONE ELSE?: NO

## 2024-12-18 SDOH — SOCIAL STABILITY: SOCIAL INSECURITY: PHYSICAL ABUSE: DENIES

## 2024-12-18 SDOH — HEALTH STABILITY: MENTAL HEALTH: WISH TO BE DEAD (PAST 1 MONTH): NO

## 2024-12-18 SDOH — SOCIAL STABILITY: SOCIAL INSECURITY: ARE THERE ANY APPARENT SIGNS OF INJURIES/BEHAVIORS THAT COULD BE RELATED TO ABUSE/NEGLECT?: NO

## 2024-12-18 SDOH — SOCIAL STABILITY: SOCIAL INSECURITY: ABUSE SCREEN: ADULT

## 2024-12-18 SDOH — SOCIAL STABILITY: SOCIAL INSECURITY: HAS ANYONE EVER THREATENED TO HURT YOUR FAMILY OR YOUR PETS?: NO

## 2024-12-18 SDOH — SOCIAL STABILITY: SOCIAL INSECURITY: DO YOU FEEL ANYONE HAS EXPLOITED OR TAKEN ADVANTAGE OF YOU FINANCIALLY OR OF YOUR PERSONAL PROPERTY?: NO

## 2024-12-18 SDOH — HEALTH STABILITY: MENTAL HEALTH: SUICIDAL BEHAVIOR (LIFETIME): NO

## 2024-12-18 SDOH — SOCIAL STABILITY: SOCIAL INSECURITY: ARE YOU OR HAVE YOU BEEN THREATENED OR ABUSED PHYSICALLY, EMOTIONALLY, OR SEXUALLY BY ANYONE?: NO

## 2024-12-18 ASSESSMENT — LIFESTYLE VARIABLES
AUDIT-C TOTAL SCORE: 0
HOW OFTEN DO YOU HAVE 6 OR MORE DRINKS ON ONE OCCASION: NEVER
SKIP TO QUESTIONS 9-10: 1
AUDIT-C TOTAL SCORE: 0
HOW MANY STANDARD DRINKS CONTAINING ALCOHOL DO YOU HAVE ON A TYPICAL DAY: PATIENT DOES NOT DRINK
HOW OFTEN DO YOU HAVE A DRINK CONTAINING ALCOHOL: NEVER

## 2024-12-18 ASSESSMENT — ACTIVITIES OF DAILY LIVING (ADL): LACK_OF_TRANSPORTATION: NO

## 2024-12-18 ASSESSMENT — PATIENT HEALTH QUESTIONNAIRE - PHQ9
1. LITTLE INTEREST OR PLEASURE IN DOING THINGS: NOT AT ALL
SUM OF ALL RESPONSES TO PHQ9 QUESTIONS 1 & 2: 0
2. FEELING DOWN, DEPRESSED OR HOPELESS: NOT AT ALL

## 2024-12-18 ASSESSMENT — PAIN SCALES - GENERAL: PAINLEVEL_OUTOF10: 0 - NO PAIN

## 2024-12-18 NOTE — H&P
OB Admission H&P    Subjective   18yo  at 39.3 presents for IOL.   Prenatal Provider alberto  Pregnancy notable for:  --sga, efw now normal, AC 6%  --gbs negative    Assessment/Plan    Horacio Dominguez is a 19 y.o.  at 39w3d, ALEJANDRO: 2024, by Last Menstrual Period, who presents for Induction of Labor.    Plan  -Admit to L&D, consented  -T&S, CBC, and Syphilis  -Epidural at patient request  -Recheck as clinically indicated by maternal or fetal status  -Plan to initiate induction with method to be determined by dr. dominguez    Fetal Status  -NST reactive, reassuring   -Presentation ceph based on ultrasound  -EFW 6#10oz by ultrasound on 24  -GBS negative    Postpartum  Contraception Plan: to discuss in the postpartum period    Pregnancy Problems (from 24 to present)       Problem Noted Diagnosed Resolved    Encounter for induction of labor 2024 by KRISTIN Harvey-KEYLAM  No    Priority:  Medium       39 weeks gestation of pregnancy ([facility]) 2024 by Fiordaliza Tran RN  No    Priority:  Medium       Overview Signed 2024  1:56 PM by Fiordaliza Tran RN     Desired provider in labor: [] CNM  [] Physician  [] Blood Products: [] Yes, accepts [] No, needs counseling  [x] Initial BMI: 20.98   [] Prenatal Labs:   [] Cervical Cancer Screening up to date  [] Rh status:   [] Genetic Screening:    [] NT US: (11-13 wks)  [] Baby ASA (if indicated):  [] Pregnancy dated by:     [] Anatomy US: (19-20 wks)  [] Federal Sterilization consent signed (if indicated):  [] 1hr GCT at 24-28wks:  [] Rhogam (if indicated):   [] Fetal Surveillance (if indicated):  [] Tdap (27-32 wks, may be given up to 36 wks if initial window missed):   [] RSV (32-36 wks) (Sept. to end of ):   [] Flu Vaccine:    [] Breastfeeding:  [] Postpartum Birth control method:   [] GBS at 36 - 37 wks:  [] 39 weeks discussion of IOL vs. Expectant management:  [] Mode of delivery ( anticipated ):          Poor fetal growth  affecting management of mother in third trimester ([facility]) 2024 by Иван Lorenz MD  No    Priority:  Medium        OB History    Para Term  AB Living   2 0 0 0 1 0   SAB IAB Ectopic Multiple Live Births   1 0 0 0 0      # Outcome Date GA Lbr Aniceto/2nd Weight Sex Type Anes PTL Lv   2 Current            1 SAB 10/28/23 8w5d    Complete         Obstetric Comments   History of regular cycles     History reviewed. No pertinent surgical history.    Social History     Tobacco Use    Smoking status: Never     Passive exposure: Never    Smokeless tobacco: Never   Substance Use Topics    Alcohol use: Never       No Known Allergies    Medications Prior to Admission   Medication Sig Dispense Refill Last Dose/Taking    prenatal no.167/folic acid/dha (ONE-A-DAY PRENATAL ORAL) Take 1 tablet by mouth once daily.   2024 at  2:00 PM     Objective     Last Vitals  Temp Pulse Resp BP MAP O2 Sat   36.9 °C (98.4 °F) 110 20 100/60 75 98 %     Blood Pressures         2024  1808 2024  1811          BP: 100/60 100/60            Physical Exam  General: NAD, mood appropriate  Cardiopulmonary: warm and well perfused, breathing comfortably on room air  Abdomen: Gravid, non-tender  Extremities: Symmetric  Speculum Exam: not indicated  Cervix: 30/-3/firm/mid     Fetal Monitoring  Baseline: 155 bpm, Variability: moderate,  Accelerations: present and Decelerations: none  Uterine Activity: No contractions seen on toco  Interpretation: Reactive    Bedside ultrasound: Yes

## 2024-12-19 ENCOUNTER — ANESTHESIA EVENT (OUTPATIENT)
Dept: OBSTETRICS AND GYNECOLOGY | Facility: HOSPITAL | Age: 19
End: 2024-12-19
Payer: COMMERCIAL

## 2024-12-19 ENCOUNTER — ANESTHESIA (OUTPATIENT)
Dept: OBSTETRICS AND GYNECOLOGY | Facility: HOSPITAL | Age: 19
End: 2024-12-19
Payer: COMMERCIAL

## 2024-12-19 LAB
BASE EXCESS BLDCOV CALC-SCNC: -3.1 MMOL/L (ref -8.1–-0.5)
BODY TEMPERATURE: 37 DEGREES CELSIUS
HBV SURFACE AG SERPL QL IA: NONREACTIVE
HCO3 BLDCOV-SCNC: 23.2 MMOL/L (ref 16–26)
HCV AB SER QL: NONREACTIVE
HIV 1+2 AB+HIV1 P24 AG SERPL QL IA: NONREACTIVE
INHALED O2 CONCENTRATION: 21 %
OXYHGB MFR BLDCOV: 50.6 % (ref 94–98)
PCO2 BLDCOV: 45 MM HG (ref 22–53)
PH BLDCOV: 7.32 PH (ref 7.19–7.47)
PO2 BLDCOV: 25 MM HG (ref 13–37)
SAO2 % BLDCOV: 51 % (ref 16–84)
TREPONEMA PALLIDUM IGG+IGM AB [PRESENCE] IN SERUM OR PLASMA BY IMMUNOASSAY: NONREACTIVE

## 2024-12-19 PROCEDURE — 1100000001 HC PRIVATE ROOM DAILY

## 2024-12-19 PROCEDURE — 2500000004 HC RX 250 GENERAL PHARMACY W/ HCPCS (ALT 636 FOR OP/ED): Performed by: NURSE ANESTHETIST, CERTIFIED REGISTERED

## 2024-12-19 PROCEDURE — 51701 INSERT BLADDER CATHETER: CPT

## 2024-12-19 PROCEDURE — 0KQM0ZZ REPAIR PERINEUM MUSCLE, OPEN APPROACH: ICD-10-PCS | Performed by: OBSTETRICS & GYNECOLOGY

## 2024-12-19 PROCEDURE — 3700000014 EPIDURAL BLOCK: Performed by: NURSE ANESTHETIST, CERTIFIED REGISTERED

## 2024-12-19 PROCEDURE — 7210000002 HC LABOR PER HOUR

## 2024-12-19 PROCEDURE — 59409 OBSTETRICAL CARE: CPT | Performed by: OBSTETRICS & GYNECOLOGY

## 2024-12-19 PROCEDURE — 2500000001 HC RX 250 WO HCPCS SELF ADMINISTERED DRUGS (ALT 637 FOR MEDICARE OP): Performed by: OBSTETRICS & GYNECOLOGY

## 2024-12-19 PROCEDURE — 59050 FETAL MONITOR W/REPORT: CPT

## 2024-12-19 PROCEDURE — 10907ZC DRAINAGE OF AMNIOTIC FLUID, THERAPEUTIC FROM PRODUCTS OF CONCEPTION, VIA NATURAL OR ARTIFICIAL OPENING: ICD-10-PCS | Performed by: OBSTETRICS & GYNECOLOGY

## 2024-12-19 PROCEDURE — 2500000004 HC RX 250 GENERAL PHARMACY W/ HCPCS (ALT 636 FOR OP/ED): Performed by: ADVANCED PRACTICE MIDWIFE

## 2024-12-19 PROCEDURE — 86803 HEPATITIS C AB TEST: CPT | Mod: AHULAB | Performed by: OBSTETRICS & GYNECOLOGY

## 2024-12-19 PROCEDURE — 82805 BLOOD GASES W/O2 SATURATION: CPT | Performed by: ADVANCED PRACTICE MIDWIFE

## 2024-12-19 PROCEDURE — 36415 COLL VENOUS BLD VENIPUNCTURE: CPT | Performed by: OBSTETRICS & GYNECOLOGY

## 2024-12-19 PROCEDURE — 3E033VJ INTRODUCTION OF OTHER HORMONE INTO PERIPHERAL VEIN, PERCUTANEOUS APPROACH: ICD-10-PCS | Performed by: OBSTETRICS & GYNECOLOGY

## 2024-12-19 PROCEDURE — 87340 HEPATITIS B SURFACE AG IA: CPT | Mod: AHULAB | Performed by: OBSTETRICS & GYNECOLOGY

## 2024-12-19 PROCEDURE — 7100000016 HC LABOR RECOVERY PER HOUR

## 2024-12-19 PROCEDURE — 01967 NEURAXL LBR ANES VAG DLVR: CPT | Performed by: NURSE ANESTHETIST, CERTIFIED REGISTERED

## 2024-12-19 RX ORDER — POLYETHYLENE GLYCOL 3350 17 G/17G
17 POWDER, FOR SOLUTION ORAL 2 TIMES DAILY PRN
Status: DISCONTINUED | OUTPATIENT
Start: 2024-12-19 | End: 2024-12-21 | Stop reason: HOSPADM

## 2024-12-19 RX ORDER — LABETALOL HYDROCHLORIDE 5 MG/ML
20 INJECTION, SOLUTION INTRAVENOUS ONCE AS NEEDED
Status: DISCONTINUED | OUTPATIENT
Start: 2024-12-19 | End: 2024-12-21 | Stop reason: HOSPADM

## 2024-12-19 RX ORDER — DIPHENHYDRAMINE HYDROCHLORIDE 50 MG/ML
25 INJECTION INTRAMUSCULAR; INTRAVENOUS EVERY 6 HOURS PRN
Status: DISCONTINUED | OUTPATIENT
Start: 2024-12-19 | End: 2024-12-21 | Stop reason: HOSPADM

## 2024-12-19 RX ORDER — METOCLOPRAMIDE HYDROCHLORIDE 5 MG/ML
10 INJECTION INTRAMUSCULAR; INTRAVENOUS EVERY 6 HOURS PRN
Status: DISCONTINUED | OUTPATIENT
Start: 2024-12-19 | End: 2024-12-21 | Stop reason: HOSPADM

## 2024-12-19 RX ORDER — BISACODYL 10 MG/1
10 SUPPOSITORY RECTAL DAILY PRN
Status: DISCONTINUED | OUTPATIENT
Start: 2024-12-19 | End: 2024-12-21 | Stop reason: HOSPADM

## 2024-12-19 RX ORDER — METOCLOPRAMIDE 10 MG/1
10 TABLET ORAL EVERY 6 HOURS PRN
Status: DISCONTINUED | OUTPATIENT
Start: 2024-12-19 | End: 2024-12-21 | Stop reason: HOSPADM

## 2024-12-19 RX ORDER — ONDANSETRON 4 MG/1
4 TABLET, FILM COATED ORAL EVERY 6 HOURS PRN
Status: DISCONTINUED | OUTPATIENT
Start: 2024-12-19 | End: 2024-12-21 | Stop reason: HOSPADM

## 2024-12-19 RX ORDER — LOPERAMIDE HYDROCHLORIDE 2 MG/1
4 CAPSULE ORAL EVERY 2 HOUR PRN
Status: DISCONTINUED | OUTPATIENT
Start: 2024-12-19 | End: 2024-12-21 | Stop reason: HOSPADM

## 2024-12-19 RX ORDER — ACETAMINOPHEN 325 MG/1
975 TABLET ORAL EVERY 6 HOURS
Status: DISCONTINUED | OUTPATIENT
Start: 2024-12-19 | End: 2024-12-21 | Stop reason: HOSPADM

## 2024-12-19 RX ORDER — NIFEDIPINE 10 MG/1
10 CAPSULE ORAL ONCE AS NEEDED
Status: DISCONTINUED | OUTPATIENT
Start: 2024-12-19 | End: 2024-12-21 | Stop reason: HOSPADM

## 2024-12-19 RX ORDER — ADHESIVE BANDAGE
10 BANDAGE TOPICAL
Status: DISCONTINUED | OUTPATIENT
Start: 2024-12-19 | End: 2024-12-21 | Stop reason: HOSPADM

## 2024-12-19 RX ORDER — HYDRALAZINE HYDROCHLORIDE 20 MG/ML
5 INJECTION INTRAMUSCULAR; INTRAVENOUS ONCE AS NEEDED
Status: DISCONTINUED | OUTPATIENT
Start: 2024-12-19 | End: 2024-12-21 | Stop reason: HOSPADM

## 2024-12-19 RX ORDER — METHYLERGONOVINE MALEATE 0.2 MG/ML
0.2 INJECTION INTRAVENOUS ONCE AS NEEDED
Status: DISCONTINUED | OUTPATIENT
Start: 2024-12-19 | End: 2024-12-21 | Stop reason: HOSPADM

## 2024-12-19 RX ORDER — ONDANSETRON HYDROCHLORIDE 2 MG/ML
4 INJECTION, SOLUTION INTRAVENOUS EVERY 6 HOURS PRN
Status: DISCONTINUED | OUTPATIENT
Start: 2024-12-19 | End: 2024-12-21 | Stop reason: HOSPADM

## 2024-12-19 RX ORDER — SIMETHICONE 80 MG
80 TABLET,CHEWABLE ORAL 4 TIMES DAILY PRN
Status: DISCONTINUED | OUTPATIENT
Start: 2024-12-19 | End: 2024-12-21 | Stop reason: HOSPADM

## 2024-12-19 RX ORDER — MISOPROSTOL 200 UG/1
800 TABLET ORAL ONCE AS NEEDED
Status: DISCONTINUED | OUTPATIENT
Start: 2024-12-19 | End: 2024-12-21 | Stop reason: HOSPADM

## 2024-12-19 RX ORDER — MORPHINE SULFATE 4 MG/ML
4 INJECTION, SOLUTION INTRAMUSCULAR; INTRAVENOUS ONCE
Status: COMPLETED | OUTPATIENT
Start: 2024-12-19 | End: 2024-12-19

## 2024-12-19 RX ORDER — LIDOCAINE HCL/EPINEPHRINE/PF 2%-1:200K
VIAL (ML) INJECTION AS NEEDED
Status: DISCONTINUED | OUTPATIENT
Start: 2024-12-19 | End: 2024-12-19

## 2024-12-19 RX ORDER — CARBOPROST TROMETHAMINE 250 UG/ML
250 INJECTION, SOLUTION INTRAMUSCULAR ONCE AS NEEDED
Status: DISCONTINUED | OUTPATIENT
Start: 2024-12-19 | End: 2024-12-21 | Stop reason: HOSPADM

## 2024-12-19 RX ORDER — TRANEXAMIC ACID 100 MG/ML
1000 INJECTION, SOLUTION INTRAVENOUS ONCE AS NEEDED
Status: DISCONTINUED | OUTPATIENT
Start: 2024-12-19 | End: 2024-12-21 | Stop reason: HOSPADM

## 2024-12-19 RX ORDER — DIPHENHYDRAMINE HCL 25 MG
25 CAPSULE ORAL EVERY 6 HOURS PRN
Status: DISCONTINUED | OUTPATIENT
Start: 2024-12-19 | End: 2024-12-21 | Stop reason: HOSPADM

## 2024-12-19 RX ORDER — IBUPROFEN 600 MG/1
600 TABLET ORAL EVERY 6 HOURS
Status: DISCONTINUED | OUTPATIENT
Start: 2024-12-19 | End: 2024-12-21 | Stop reason: HOSPADM

## 2024-12-19 RX ORDER — OXYTOCIN/0.9 % SODIUM CHLORIDE 30/500 ML
60 PLASTIC BAG, INJECTION (ML) INTRAVENOUS ONCE AS NEEDED
Status: DISCONTINUED | OUTPATIENT
Start: 2024-12-19 | End: 2024-12-21 | Stop reason: HOSPADM

## 2024-12-19 RX ORDER — OXYTOCIN 10 [USP'U]/ML
10 INJECTION, SOLUTION INTRAMUSCULAR; INTRAVENOUS ONCE AS NEEDED
Status: DISCONTINUED | OUTPATIENT
Start: 2024-12-19 | End: 2024-12-21 | Stop reason: HOSPADM

## 2024-12-19 RX ORDER — LIDOCAINE 560 MG/1
1 PATCH PERCUTANEOUS; TOPICAL; TRANSDERMAL
Status: DISCONTINUED | OUTPATIENT
Start: 2024-12-19 | End: 2024-12-21 | Stop reason: HOSPADM

## 2024-12-19 SDOH — HEALTH STABILITY: MENTAL HEALTH: CURRENT SMOKER: 0

## 2024-12-19 ASSESSMENT — PAIN SCALES - GENERAL
PAINLEVEL_OUTOF10: 0 - NO PAIN
PAINLEVEL_OUTOF10: 8
PAINLEVEL_OUTOF10: 0 - NO PAIN

## 2024-12-19 NOTE — PROGRESS NOTES
Patient doing well, no CTX, baby moving, no headaches  FHT category 1  VE 1/60%/-2 VTX   Discussed options and she agrees to Proctor bulb and Cytotec  Proctor bulb inserted with some difficulty as cervix posterior  Cytotec # 1 inserted.  Plan for Cytotec every 3-4 hours  Plan to get epidural later  Anticipating vaginal delivery.

## 2024-12-19 NOTE — PROGRESS NOTES
Proctor bulb now out, cervical exam deferred  Fetal heart rate with baseline of 160 since arrival. Now approx 170 with moderate variability and accels. No decelerations noted.   Cat II for tachycardia  Contractions are irregular and difficult to assess.   Patient remains afebrile, yet anxious. She is endorsing pain, and requesting medication. Unknown etiology of fetal tachycardia at this time.   Morphine 4 mg x 1 dose ordered.   Will hold pitocin until fetal tracing is cat I.

## 2024-12-19 NOTE — ANESTHESIA PROCEDURE NOTES
Epidural Block    Patient location during procedure: OB  Start time: 12/19/2024 3:20 AM  End time: 12/19/2024 3:35 AM  Reason for block: labor analgesia  Staffing  Performed: CRNA   Authorized by: MERLE Alegria    Performed by: MERLE Alegria    Preanesthetic Checklist  Completed: patient identified, IV checked, risks and benefits discussed, surgical consent, pre-op evaluation, timeout performed and sterile techniques followed  Block Timeout  RN/Licensed healthcare professional reads aloud to the Anesthesia provider and entire team: Patient identity, procedure with side and site, patient position, and as applicable the availability of implants/special equipment/special requirements.  Patient on coagulant treatment: yes  Timeout performed at: 12/19/2024 3:20 AM  Block Placement  Patient position: sitting  Prep: Betadine  Sterility prep: gloves, mask and drape  Sedation level: no sedation  Patient monitoring: continuous pulse oximetry, blood pressure and heart rate  Approach: midline  Local numbing: lidocaine 1% to skin and subcutaneous tissues  Vertebral space: lumbar  Lumbar location: L3-L4  Epidural  Loss of resistance technique: air  Guidance: landmark technique        Needle  Needle type: Tuohy   Needle gauge: 17  Catheter type: multi-orifice  Catheter at skin depth: 11 cm  Catheter securement method: clear occlusive dressing and surgical tape    Test dose: lidocaine 1.5% with epinephrine 1-to-200,000  Test dose: lidocaine 1.5% with epinephrine 1-to-200,000  Test dose result: no positive test dose    PCEA  Medication concentration used: 0.2% Ropivacaine with 2 mcg/mL Fentanyl  Dose (mL): 4  Lockout (minutes): 20  1-Hour Limit (boluses/hr): 3  Basal Rate: 10        Assessment  Sensory level: T6  Block outcome: pain improved  Number of attempts: 1  Events: no positive test dose

## 2024-12-19 NOTE — ANESTHESIA PREPROCEDURE EVALUATION
Patient: Horacio Dominguez    Evaluation Method: In-person visit    Procedure Information    Date: 12/19/24  Procedure: Labor Analgesia         Relevant Problems   GYN   (+) 39 weeks gestation of pregnancy ([facility])       Clinical information reviewed:    Allergies  Meds  Problems   Surg Hx            NPO Detail:  No data recorded     OB/GYN     Physical Exam    Airway  Mallampati: II  TM distance: >3 FB  Neck ROM: full     Cardiovascular - normal exam     Dental - normal exam     Pulmonary - normal exam     Abdominal - normal exam             Anesthesia Plan    History of general anesthesia?: no  History of complications of general anesthesia?: unknown/emergency    ASA 2     epidural     The patient is not a current smoker.  Patient was previously instructed to abstain from smoking on day of procedure.  Patient did not smoke on day of procedure.    Anesthetic plan and risks discussed with patient.

## 2024-12-19 NOTE — ANESTHESIA POSTPROCEDURE EVALUATION
Patient: Horacio Dominguez    Procedure Summary       Date: 12/19/24 Room / Location:     Anesthesia Start: 0320 Anesthesia Stop: 0504    Procedure: Labor Analgesia Diagnosis:     Scheduled Providers:  Responsible Provider: MERLE Alegria    Anesthesia Type: epidural ASA Status: 2            Anesthesia Type: epidural    Vitals Value Taken Time   /63 12/19/24 0640   Temp 36.5 12/19/24 0647   Pulse 86 12/19/24 0645   Resp 12 12/19/24 0647   SpO2 99 % 12/19/24 0645       Anesthesia Post Evaluation    Patient location during evaluation: PACU  Patient participation: complete - patient participated  Level of consciousness: awake  Pain management: satisfactory to patient  Airway patency: patent  Cardiovascular status: acceptable  Respiratory status: acceptable  Hydration status: acceptable  Postoperative Nausea and Vomiting: none        No notable events documented.

## 2024-12-19 NOTE — PROGRESS NOTES
Patient more comfortable with epidural  FHT few late decelerations good variabilities  VE AROM with thick meconium  Will start pushing

## 2024-12-19 NOTE — L&D DELIVERY NOTE
OB Delivery Note  2024  Horacio Angelica  19 y.o.  had 2 doses of Cytotec and Proctor bulb and progressed rapidly to 9 cm/bulging bag.   She was able to get her epidural, then AROM with thick meconium.  She pushed well for approximately 1 hour with some fetal heart rate deceleration which improved with positioning.   She delivered over second degree perineal tear, mouth and nose suctioned, CAN x 2 tight clamped and cut at perineum.  Baby handed off to waiting code team.  EBL 50 ml.  Second degree perineal tear repaired in usual fashion.   Apgars  4,9,9.             Gestational Age: 39w4d  /Para:   Quantitative Blood Loss: Admission to Discharge: 0 mL (2024  5:38 PM - 2024  5:28 AM)    AngelicaCamdenCharli [[id number]]      Labor Events    Rupture date/time: 2024  Rupture type: Artificial  Fluid color: Meconium  Fluid odor: None       Labor Event Times    Dilation complete date/time: 2024       Placenta    Placenta delivery date/time:   Placenta removal:         Delivery    Birth date/time: 2026 05:04:00  Delivery type:        Apgars    Living status:   Apgar Component Scores:  1 min.:  5 min.:  10 min.:  15 min.:  20 min.:    Skin color:         Heart rate:         Reflex irritability:         Muscle tone:         Respiratory effort:         Total:                Delivery Providers    Delivering clinician:               Intrauterine Device Inserted : Иван Gonzalez MD

## 2024-12-19 NOTE — PROGRESS NOTES
Patient very uncomfortable   FHT category 1   VE 9 cm/bulging bag  She is requesting epidural  S/P 2 doses of Cytotec only  Will try to get epidural

## 2024-12-19 NOTE — LACTATION NOTE
Lactation Consultant Note  Lactation Consultation  Reason for Consult: Initial assessment  Consultant Name: Babita AVILA    Maternal Information  Has mother  before?: No    Maternal Assessment       Infant Assessment       Feeding Assessment       LATCH TOOL       Breast Pump  Pump: Hospital grade electric pump  Frequency: 8-10 times per day  Duration: 15-20 minutes per session  Breast Shield Size and Type: 24 mm  Units of Volume: Drops    Other OB Lactation Tools       Patient Follow-up  Inpatient Lactation Follow-up Needed : Yes    Other OB Lactation Documentation       Recommendations/Summary  Baby is in the SCN at this time mom was set up to pump and was shown how to use the pump and clean the pump parts. Mom was instructed to pump at least every 2-3 hours. Reviewed milk supply patterns and  feeding patterns. Breastfeeding/pumping education reviewed and questions answered. Mom aware of lactation support and asked to call out for feed assistance and with questions as needed.

## 2024-12-20 ENCOUNTER — PHARMACY VISIT (OUTPATIENT)
Dept: PHARMACY | Facility: CLINIC | Age: 19
End: 2024-12-20
Payer: COMMERCIAL

## 2024-12-20 LAB
RUBV IGG SERPL IA-ACNC: 3.6 IA
RUBV IGG SERPL QL IA: POSITIVE

## 2024-12-20 PROCEDURE — RXMED WILLOW AMBULATORY MEDICATION CHARGE

## 2024-12-20 PROCEDURE — 1100000001 HC PRIVATE ROOM DAILY

## 2024-12-20 PROCEDURE — 99024 POSTOP FOLLOW-UP VISIT: CPT | Performed by: ADVANCED PRACTICE MIDWIFE

## 2024-12-20 PROCEDURE — 2500000001 HC RX 250 WO HCPCS SELF ADMINISTERED DRUGS (ALT 637 FOR MEDICARE OP): Performed by: OBSTETRICS & GYNECOLOGY

## 2024-12-20 RX ORDER — ACETAMINOPHEN 500 MG
1000 TABLET ORAL EVERY 6 HOURS PRN
Qty: 120 TABLET | Refills: 0 | Status: SHIPPED | OUTPATIENT
Start: 2024-12-20

## 2024-12-20 RX ORDER — IBUPROFEN 600 MG/1
600 TABLET ORAL EVERY 6 HOURS PRN
Qty: 100 TABLET | Refills: 0 | Status: SHIPPED | OUTPATIENT
Start: 2024-12-20

## 2024-12-20 RX ORDER — DOCUSATE SODIUM 100 MG/1
100 CAPSULE, LIQUID FILLED ORAL 2 TIMES DAILY PRN
Qty: 100 CAPSULE | Refills: 0 | Status: SHIPPED | OUTPATIENT
Start: 2024-12-20

## 2024-12-20 ASSESSMENT — PAIN SCALES - GENERAL
PAINLEVEL_OUTOF10: 0 - NO PAIN

## 2024-12-20 NOTE — LACTATION NOTE
Lactation Consultant Note  Lactation Consultation  Reason for Consult: Follow-up assessment  Consultant Name: Babita AVILA    Maternal Information  Has mother  before?: No  Infant to breast within first 2 hours of birth?: Yes    Maternal Assessment  Breast Assessment: Large, Soft, Compressible  Nipple Assessment: Intact, Erect  [place] Assessment: Normal    Infant Assessment  Infant Behavior: Awake  Infant Assessment:  (feeding)    Feeding Assessment  Nutrition Source: Breastmilk  Feeding Method: Nursing at the breast  Feeding Position: Cross - cradle  Suck/Feeding: Sustained  Latch Assessment: Minimal assistance is needed, Sucking and swallowing, Chin moves in rhythmic motion, Sucks with long jaw movement    LATCH TOOL  Latch: Grasps breast, tongue down, lips flanged, rhythmic sucking  Audible Swallowing: A few with stimulation  Type of Nipple: Everted (After stimulation)  Comfort (Breast/Nipple): Soft/non-tender  Hold (Positioning): Minimal assist, teach one side, mother does other, staff holds  LATCH Score: 8    Breast Pump  Frequency: 8-10 times per day  Duration: 15-20 minutes per session    Other OB Lactation Tools       Patient Follow-up  Inpatient Lactation Follow-up Needed :  (as needed)    Other OB Lactation Documentation  Additional Problem Noted: S/P SCN stay    Recommendations/Summary  Observed feed. Mom had baby latched to the right breast baby appeared to be latched deeply, sucking with long jaw movement with swallows noted. Reviewed milk supply patterns and  feeding patterns in the fist and second 24 HOL.Mom was asked to attempt/feed baby at least every 2-3 hours or on demand with a goal of 8-12 feeds daily. Feeding cues reviewed.Breastfeeding education reviewed and questions answered. Mom aware of lactation support and asked to call out for feed assistance and with questions as needed.

## 2024-12-20 NOTE — PROGRESS NOTES
Postpartum Progress Note    Assessment/Plan   Horacio Dominguez is a 19 y.o., , who delivered at 39w4d gestation and is now postpartum day 1.    Maternal Wellbeing  - continue routine postpartum care  - pain well controlled on po medications  - Patient has been assessed to have a DVT risk score of 3, prophylactic lovenox not indicated  - Patient is Rh Positive (Rh+).    [place] Feeding  - Encouraged breastfeeding, lactation consult as needed  - Reviewed latch, expectations with engorgement and mgmt    Contraception  - declines, will discuss at PP visit.  Considering pills.    Pregnancy notable for  - poor fetal growth    Disposition:  - appropriate for d/c on PPD #2 if remains stable  - for f/u 4-6 weeks Primary OB provider for routine PPV  - Reviewed warning s/sxs for followup and given contact information for emergency care    KRISTIN Croft-NIKKY    Subjective   Her pain is well controlled with current medications. She is passing flatus. She is ambulating well. She is tolerating a regular diet. She reports no breast or nursing problems. She denies emotional concerns today. Her plan for contraception is none, will discuss with provider at 6wk visit.    Hospital course: no complications    Objective   Allergies:   Patient has no known allergies.         Last Vitals:  Temp Pulse Resp BP MAP Pulse Ox   36.4 °C (97.6 °F) 97 16 105/78   96 %     Vitals Min/Max Last 24 Hours:  Temp  Min: 36.1 °C (97 °F)  Max: 36.8 °C (98.2 °F)  Pulse  Min: 90  Max: 105  Resp  Min: 16  Max: 18  BP  Min: 93/64  Max: 122/79    Intake/Output:   No intake or output data in the 24 hours ending 24 0922    Physical Exam:  General: Examination reveals a well developed, well nourished, female, in no acute distress and whose affect is appropriate. She is alert and cooperative.  Breasts: breasts appear normal for pregnancy, no suspicious masses, no skin or nipple changes or axillary nodes.  Abdomen: soft, nongravid, nontender,  nondistended, no abnormal masses, no epigastric pain.  Fundus: firm and below umbilicus.  Perineum: well approximated.  Extremities: no redness or tenderness in the calves or thighs, no edema.  Psychological: awake and alert; oriented to person, place, and time.    Lab Data:  Labs in chart were reviewed.

## 2024-12-21 VITALS
RESPIRATION RATE: 16 BRPM | OXYGEN SATURATION: 98 % | HEART RATE: 95 BPM | SYSTOLIC BLOOD PRESSURE: 110 MMHG | DIASTOLIC BLOOD PRESSURE: 75 MMHG | TEMPERATURE: 98.6 F | BODY MASS INDEX: 26.06 KG/M2 | HEIGHT: 61 IN | WEIGHT: 138.01 LBS

## 2024-12-21 PROCEDURE — 2500000001 HC RX 250 WO HCPCS SELF ADMINISTERED DRUGS (ALT 637 FOR MEDICARE OP): Performed by: OBSTETRICS & GYNECOLOGY

## 2024-12-21 ASSESSMENT — PAIN SCALES - GENERAL
PAINLEVEL_OUTOF10: 1
PAINLEVEL_OUTOF10: 3
PAINLEVEL_OUTOF10: 2

## 2024-12-21 ASSESSMENT — PAIN DESCRIPTION - DESCRIPTORS: DESCRIPTORS: CRAMPING

## 2024-12-21 ASSESSMENT — PAIN DESCRIPTION - LOCATION: LOCATION: ABDOMEN

## 2024-12-21 NOTE — LACTATION NOTE
Lactation Consultant Note  Lactation Consultation  Reason for Consult: Follow-up assessment  Consultant Name: Babita HILLS    Maternal Information  Has mother  before?: No  Infant to breast within first 2 hours of birth?: Yes    Maternal Assessment  Breast Assessment: Large, Soft, Compressible    Infant Assessment  Infant Behavior: Deep sleep, Content after feeding    Feeding Assessment  Nutrition Source: Breastmilk  Feeding Method: Nursing at the breast    LATCH TOOL       Breast Pump       Other OB Lactation Tools       Patient Follow-up       Other OB Lactation Documentation       Recommendations/Summary  IN for follow up. Mom states she just fed infant. Infant appears content in mothers lap. Mom states infant fed well and denies any discomfort with latch. Offered assistance with  next latch if needed.

## 2024-12-21 NOTE — DISCHARGE SUMMARY
Discharge Summary    Admission Date: 12/18/2024  Discharge Date: 12/21/2024    Discharge Diagnosis  Encounter for induction of labor    Hospital Course  Delivery Date: 12/19/2024 5:04 AM  Delivery type: Vaginal, Spontaneous   GA at delivery: 39w4d  Outcome: Living  Anesthesia during delivery: Epidural  Intrapartum complications: None  Feeding method: Breastfeeding Status: Yes     Procedures: none  Contraception at discharge: deferred to her PPV with primary OB provider      Camdenchetna states she is feeling well and ready for discharge to home today. Pain is well-managed with IBU and tylenol and bleeding is light. She is urinating and passing flatus without issue and tolerating PO diet well. Denies any nursing issues at this time.     Pertinent Physical Exam At Time of Discharge    General: Examination reveals a well developed, well nourished, female, in no acute distress. She is alert and cooperative.  Lungs: unlabored breathing.  Abdomen: soft, nontender.  Fundus: firm, below umbilicus, and nontender.  Extremities: no redness or tenderness in the calves or thighs, no edema.  Neurological: alert, oriented, normal speech, no focal findings or movement disorder noted.  Psychological: awake and alert; oriented to person, place, and time.    Last Vitals:  Temp Pulse Resp BP MAP Pulse Ox   37 °C (98.6 °F) 95 16 110/75 80 98 %     Discharge Meds     Your medication list        START taking these medications        Instructions Last Dose Given Next Dose Due   acetaminophen 500 mg tablet  Commonly known as: Tylenol Extra Strength      Take 2 tablets (1,000 mg) by mouth every 6 hours if needed for mild pain (1 - 3) or moderate pain (4 - 6).       docusate sodium 100 mg capsule  Commonly known as: Colace      Take 1 capsule (100 mg) by mouth 2 times a day as needed for constipation.       ibuprofen 600 mg tablet      Take 1 tablet (600 mg) by mouth every 6 hours if needed for mild pain (1 - 3) or moderate pain (4 - 6).               CONTINUE taking these medications        Instructions Last Dose Given Next Dose Due   ONE-A-DAY PRENATAL ORAL                     Where to Get Your Medications        These medications were sent to [facility]  [address], [address], [facility] [id number]      Hours: 8 AM to 6 PM Mon-Fri, 9 AM to 1 PM Saturday Phone: [phone]   acetaminophen 500 mg tablet  docusate sodium 100 mg capsule  ibuprofen 600 mg tablet          Complications Requiring Follow-Up  None  Will plan for 4-6 week postpartum visit with primary OB provider.     Test Results Pending At Discharge  Pending Labs       Order Current Status    Surgical Pathology Exam - [facility] In process            Outpatient Follow-Up  Future Appointments   Date Time Provider Department Center   1/2/2025  1:20 PM Иван Lorenz MD [id number]       I spent 20 minutes in the professional and overall care of this patient.      KRISTIN Cardoso-NIKKY

## 2024-12-23 ENCOUNTER — APPOINTMENT (OUTPATIENT)
Dept: OBSTETRICS AND GYNECOLOGY | Facility: CLINIC | Age: 19
End: 2024-12-23
Payer: COMMERCIAL

## 2024-12-23 ENCOUNTER — APPOINTMENT (OUTPATIENT)
Dept: RADIOLOGY | Facility: CLINIC | Age: 19
End: 2024-12-23
Payer: COMMERCIAL

## 2024-12-24 ENCOUNTER — APPOINTMENT (OUTPATIENT)
Dept: OBSTETRICS AND GYNECOLOGY | Facility: CLINIC | Age: 19
End: 2024-12-24
Payer: COMMERCIAL

## 2024-12-30 LAB
LABORATORY COMMENT REPORT: NORMAL
PATH REPORT.FINAL DX SPEC: NORMAL
PATH REPORT.GROSS SPEC: NORMAL
PATH REPORT.RELEVANT HX SPEC: NORMAL
PATH REPORT.TOTAL CANCER: NORMAL

## 2025-01-02 ENCOUNTER — APPOINTMENT (OUTPATIENT)
Dept: OBSTETRICS AND GYNECOLOGY | Facility: CLINIC | Age: 20
End: 2025-01-02
Payer: COMMERCIAL

## 2025-01-07 ENCOUNTER — APPOINTMENT (OUTPATIENT)
Dept: OBSTETRICS AND GYNECOLOGY | Facility: CLINIC | Age: 20
End: 2025-01-07
Payer: COMMERCIAL

## 2025-01-07 VITALS — WEIGHT: 123 LBS | BODY MASS INDEX: 23.24 KG/M2 | DIASTOLIC BLOOD PRESSURE: 68 MMHG | SYSTOLIC BLOOD PRESSURE: 102 MMHG

## 2025-01-07 DIAGNOSIS — Z30.011 ENCOUNTER FOR INITIAL PRESCRIPTION OF CONTRACEPTIVE PILLS: Primary | ICD-10-CM

## 2025-01-07 RX ORDER — NORETHINDRONE 0.35 MG/1
1 TABLET ORAL DAILY
Qty: 28 TABLET | Refills: 11 | Status: SHIPPED | OUTPATIENT
Start: 2025-01-07 | End: 2026-01-07

## 2025-01-07 NOTE — PROGRESS NOTES
Subjective   Patient ID: Horacio Dominguez is a 19 y.o. female who presents for Postpartum Follow-up (Here for 2 week pp ).  HPI 2 weeks pp.  Happy doing well breast feeding and is going well.  Her mom is visiting and is a big help.   She wants to start OCP.   She denies any pain, bleeding minimum.   She reports some normal bladder and bowel function.    Review of Systems   All other systems reviewed and are negative.      Objective   Physical Exam  Constitutional:       Appearance: Normal appearance.   Pulmonary:      Effort: Pulmonary effort is normal.   Neurological:      Mental Status: She is alert.   Psychiatric:         Mood and Affect: Mood normal.         Behavior: Behavior normal.         Judgment: Judgment normal.         Assessment/Plan   Problem List Items Addressed This Visit    None  Visit Diagnoses         Codes    Encounter for initial prescription of contraceptive pills    -  Primary Z30.011    Relevant Medications    norethindrone (Micronor) 0.35 mg tablet    Postpartum exam ([facility])     Z39.2        I have recommended minipill this was E scribed for her instruction were reviewed.  Follow-up in 4 weeks.         Иван Lorenz MD 01/07/25 5:00 PM

## 2025-02-10 ENCOUNTER — APPOINTMENT (OUTPATIENT)
Dept: OBSTETRICS AND GYNECOLOGY | Facility: CLINIC | Age: 20
End: 2025-02-10
Payer: COMMERCIAL

## 2025-02-10 VITALS — BODY MASS INDEX: 23.43 KG/M2 | DIASTOLIC BLOOD PRESSURE: 62 MMHG | WEIGHT: 124 LBS | SYSTOLIC BLOOD PRESSURE: 102 MMHG

## 2025-02-10 PROCEDURE — 0503F POSTPARTUM CARE VISIT: CPT | Performed by: OBSTETRICS & GYNECOLOGY

## 2025-02-10 NOTE — PROGRESS NOTES
Subjective   Patient ID: Horacio Dominguez is a 19 y.o. female who presents for Postpartum Follow-up (Here for postpartum visit ).  HPI 6 weeks postpartum doing well.  She denies any postpartum depression.  She is breast-feeding and baby is doing well.  She has not started her minipill yet but says that they are traveling to [place] and she is planning to start before they leave.  She says her bleeding has stopped about couple of weeks ago.  She has no concern.    Review of Systems   All other systems reviewed and are negative.      Objective   Physical Exam  Constitutional:       Appearance: Normal appearance.   Pulmonary:      Effort: Pulmonary effort is normal.   Abdominal:      General: Abdomen is flat.      Palpations: Abdomen is soft.   Genitourinary:     General: Normal vulva.      Vagina: Normal.      Cervix: Normal.      Uterus: Normal.       Adnexa: Right adnexa normal and left adnexa normal.   Neurological:      Mental Status: She is alert.   Psychiatric:         Mood and Affect: Mood normal.         Behavior: Behavior normal.         Assessment/Plan   Problem List Items Addressed This Visit    None  Visit Diagnoses         Codes    Postpartum exam ([facility])    -  Primary Z39.2        Her exam was normal and she was reassured.  Once again we discussed about safety on minipill there is a very good chance that she will not have a cycle.  She is okay about this and has the prescription and is planning to start in a couple of months.  Follow-up as needed or yearly.         Иван Lorenz MD 02/10/25 12:15 PM